# Patient Record
Sex: MALE | Race: WHITE | NOT HISPANIC OR LATINO
[De-identification: names, ages, dates, MRNs, and addresses within clinical notes are randomized per-mention and may not be internally consistent; named-entity substitution may affect disease eponyms.]

---

## 2017-01-25 ENCOUNTER — APPOINTMENT (OUTPATIENT)
Dept: OPHTHALMOLOGY | Facility: CLINIC | Age: 82
End: 2017-01-25

## 2017-03-06 ENCOUNTER — APPOINTMENT (OUTPATIENT)
Dept: OPHTHALMOLOGY | Facility: CLINIC | Age: 82
End: 2017-03-06

## 2017-04-17 ENCOUNTER — APPOINTMENT (OUTPATIENT)
Dept: OPHTHALMOLOGY | Facility: CLINIC | Age: 82
End: 2017-04-17

## 2017-06-02 ENCOUNTER — APPOINTMENT (OUTPATIENT)
Dept: OPHTHALMOLOGY | Facility: CLINIC | Age: 82
End: 2017-06-02

## 2017-07-21 ENCOUNTER — APPOINTMENT (OUTPATIENT)
Dept: OPHTHALMOLOGY | Facility: CLINIC | Age: 82
End: 2017-07-21

## 2017-09-14 ENCOUNTER — APPOINTMENT (OUTPATIENT)
Dept: OPHTHALMOLOGY | Facility: CLINIC | Age: 82
End: 2017-09-14
Payer: MEDICARE

## 2017-09-14 PROCEDURE — 92134 CPTRZ OPH DX IMG PST SGM RTA: CPT

## 2017-09-14 PROCEDURE — 67028 INJECTION EYE DRUG: CPT | Mod: RT

## 2017-11-02 ENCOUNTER — APPOINTMENT (OUTPATIENT)
Dept: OPHTHALMOLOGY | Facility: CLINIC | Age: 82
End: 2017-11-02
Payer: MEDICARE

## 2017-11-02 PROCEDURE — 92012 INTRM OPH EXAM EST PATIENT: CPT | Mod: 25

## 2017-11-02 PROCEDURE — 67028 INJECTION EYE DRUG: CPT | Mod: RT

## 2017-11-02 PROCEDURE — 92134 CPTRZ OPH DX IMG PST SGM RTA: CPT

## 2017-12-19 ENCOUNTER — APPOINTMENT (OUTPATIENT)
Dept: OPHTHALMOLOGY | Facility: CLINIC | Age: 82
End: 2017-12-19
Payer: MEDICARE

## 2017-12-19 PROCEDURE — 92226: CPT | Mod: LT

## 2017-12-19 PROCEDURE — 92134 CPTRZ OPH DX IMG PST SGM RTA: CPT

## 2017-12-19 PROCEDURE — 67028 INJECTION EYE DRUG: CPT | Mod: RT

## 2018-02-15 ENCOUNTER — APPOINTMENT (OUTPATIENT)
Dept: OPHTHALMOLOGY | Facility: CLINIC | Age: 83
End: 2018-02-15
Payer: MEDICARE

## 2018-02-15 DIAGNOSIS — H35.3230 EXUDATIVE AGE-RELATED MACULAR DEGENERATION, BILATERAL, STAGE UNSPECIFIED: ICD-10-CM

## 2018-02-15 PROCEDURE — 92226: CPT | Mod: LT

## 2018-02-15 PROCEDURE — 67028 INJECTION EYE DRUG: CPT | Mod: RT

## 2018-02-15 PROCEDURE — 92134 CPTRZ OPH DX IMG PST SGM RTA: CPT

## 2018-04-19 ENCOUNTER — APPOINTMENT (OUTPATIENT)
Dept: OPHTHALMOLOGY | Facility: CLINIC | Age: 83
End: 2018-04-19
Payer: MEDICARE

## 2018-04-19 DIAGNOSIS — Z00.00 ENCOUNTER FOR GENERAL ADULT MEDICAL EXAMINATION W/OUT ABNORMAL FINDINGS: ICD-10-CM

## 2018-04-19 PROCEDURE — 92134 CPTRZ OPH DX IMG PST SGM RTA: CPT

## 2018-04-19 PROCEDURE — 67028 INJECTION EYE DRUG: CPT | Mod: RT

## 2018-06-28 ENCOUNTER — APPOINTMENT (OUTPATIENT)
Dept: OPHTHALMOLOGY | Facility: CLINIC | Age: 83
End: 2018-06-28
Payer: MEDICARE

## 2018-06-28 DIAGNOSIS — H35.3221 EXUDATIVE AGE-RELATED MACULAR DEGENERATION, LEFT EYE, WITH ACTIVE CHOROIDAL NEOVASCULARIZATION: ICD-10-CM

## 2018-06-28 PROCEDURE — 92226: CPT | Mod: LT

## 2018-06-28 PROCEDURE — 92012 INTRM OPH EXAM EST PATIENT: CPT | Mod: 25

## 2018-06-28 PROCEDURE — 92134 CPTRZ OPH DX IMG PST SGM RTA: CPT

## 2018-06-28 PROCEDURE — 67028 INJECTION EYE DRUG: CPT | Mod: RT

## 2018-09-06 ENCOUNTER — APPOINTMENT (OUTPATIENT)
Dept: OPHTHALMOLOGY | Facility: CLINIC | Age: 83
End: 2018-09-06
Payer: MEDICARE

## 2018-09-06 PROCEDURE — 92134 CPTRZ OPH DX IMG PST SGM RTA: CPT

## 2018-09-06 PROCEDURE — 67028 INJECTION EYE DRUG: CPT | Mod: RT

## 2018-11-15 ENCOUNTER — APPOINTMENT (OUTPATIENT)
Dept: OPHTHALMOLOGY | Facility: CLINIC | Age: 83
End: 2018-11-15
Payer: MEDICARE

## 2018-11-15 PROCEDURE — 67028 INJECTION EYE DRUG: CPT | Mod: RT

## 2018-11-15 PROCEDURE — 92134 CPTRZ OPH DX IMG PST SGM RTA: CPT

## 2019-01-24 ENCOUNTER — APPOINTMENT (OUTPATIENT)
Dept: OPHTHALMOLOGY | Facility: CLINIC | Age: 84
End: 2019-01-24
Payer: MEDICARE

## 2019-01-24 PROCEDURE — 92134 CPTRZ OPH DX IMG PST SGM RTA: CPT

## 2019-01-24 PROCEDURE — 67028 INJECTION EYE DRUG: CPT | Mod: RT

## 2019-01-24 PROCEDURE — 92012 INTRM OPH EXAM EST PATIENT: CPT | Mod: 25

## 2019-04-10 ENCOUNTER — APPOINTMENT (OUTPATIENT)
Dept: OPHTHALMOLOGY | Facility: CLINIC | Age: 84
End: 2019-04-10
Payer: MEDICARE

## 2019-04-10 DIAGNOSIS — H33.313 HORSESHOE TEAR OF RETINA W/OUT DETACHMENT, BILATERAL: ICD-10-CM

## 2019-04-10 DIAGNOSIS — H01.006 UNSPECIFIED BLEPHARITIS RIGHT EYE, UNSPECIFIED EYELID: ICD-10-CM

## 2019-04-10 DIAGNOSIS — H01.003 UNSPECIFIED BLEPHARITIS RIGHT EYE, UNSPECIFIED EYELID: ICD-10-CM

## 2019-04-10 DIAGNOSIS — H43.813 VITREOUS DEGENERATION, BILATERAL: ICD-10-CM

## 2019-04-10 DIAGNOSIS — H35.3124 NONEXUDATIVE AGE-RELATED MACULAR DEGENERATION, LEFT EYE, ADVANCED ATROPHIC WITH SUBFOVEAL INVOLVEMENT: ICD-10-CM

## 2019-04-10 DIAGNOSIS — Z96.1 PRESENCE OF INTRAOCULAR LENS: ICD-10-CM

## 2019-04-10 PROCEDURE — 92134 CPTRZ OPH DX IMG PST SGM RTA: CPT

## 2019-04-10 PROCEDURE — 67028 INJECTION EYE DRUG: CPT | Mod: RT

## 2019-04-16 ENCOUNTER — APPOINTMENT (OUTPATIENT)
Dept: OPHTHALMOLOGY | Facility: CLINIC | Age: 84
End: 2019-04-16
Payer: MEDICARE

## 2019-04-16 DIAGNOSIS — H35.3211 EXUDATIVE AGE-RELATED MACULAR DEGENERATION, RIGHT EYE, WITH ACTIVE CHOROIDAL NEOVASCULARIZATION: ICD-10-CM

## 2019-04-16 PROCEDURE — 92134 CPTRZ OPH DX IMG PST SGM RTA: CPT

## 2019-04-16 PROCEDURE — 92012 INTRM OPH EXAM EST PATIENT: CPT

## 2019-04-18 PROBLEM — H35.3211 EXUDATIVE AGE-RELATED MACULAR DEGENERATION OF RIGHT EYE WITH ACTIVE CHOROIDAL NEOVASCULARIZATION: Status: ACTIVE | Noted: 2018-02-15

## 2019-06-12 ENCOUNTER — APPOINTMENT (OUTPATIENT)
Dept: OPHTHALMOLOGY | Facility: CLINIC | Age: 84
End: 2019-06-12
Payer: MEDICARE

## 2019-06-12 ENCOUNTER — NON-APPOINTMENT (OUTPATIENT)
Age: 84
End: 2019-06-12

## 2019-06-12 PROCEDURE — 92134 CPTRZ OPH DX IMG PST SGM RTA: CPT

## 2019-06-12 PROCEDURE — 67028 INJECTION EYE DRUG: CPT | Mod: RT

## 2019-08-21 ENCOUNTER — NON-APPOINTMENT (OUTPATIENT)
Age: 84
End: 2019-08-21

## 2019-08-21 ENCOUNTER — APPOINTMENT (OUTPATIENT)
Dept: OPHTHALMOLOGY | Facility: CLINIC | Age: 84
End: 2019-08-21
Payer: MEDICARE

## 2019-08-21 PROCEDURE — 92012 INTRM OPH EXAM EST PATIENT: CPT | Mod: 25

## 2019-08-21 PROCEDURE — 67028 INJECTION EYE DRUG: CPT | Mod: RT

## 2019-08-21 PROCEDURE — 92134 CPTRZ OPH DX IMG PST SGM RTA: CPT

## 2019-08-21 PROCEDURE — 92226: CPT | Mod: LT

## 2019-11-06 ENCOUNTER — NON-APPOINTMENT (OUTPATIENT)
Age: 84
End: 2019-11-06

## 2019-11-06 ENCOUNTER — APPOINTMENT (OUTPATIENT)
Dept: OPHTHALMOLOGY | Facility: CLINIC | Age: 84
End: 2019-11-06
Payer: MEDICARE

## 2019-11-06 PROCEDURE — 92134 CPTRZ OPH DX IMG PST SGM RTA: CPT

## 2019-11-06 PROCEDURE — 67028 INJECTION EYE DRUG: CPT | Mod: RT

## 2020-01-29 ENCOUNTER — APPOINTMENT (OUTPATIENT)
Dept: OPHTHALMOLOGY | Facility: CLINIC | Age: 85
End: 2020-01-29
Payer: MEDICARE

## 2020-01-29 ENCOUNTER — NON-APPOINTMENT (OUTPATIENT)
Age: 85
End: 2020-01-29

## 2020-01-29 PROCEDURE — 92012 INTRM OPH EXAM EST PATIENT: CPT | Mod: 25

## 2020-01-29 PROCEDURE — 92134 CPTRZ OPH DX IMG PST SGM RTA: CPT

## 2020-01-29 PROCEDURE — 67028 INJECTION EYE DRUG: CPT | Mod: RT

## 2020-04-27 ENCOUNTER — APPOINTMENT (OUTPATIENT)
Dept: OPHTHALMOLOGY | Facility: CLINIC | Age: 85
End: 2020-04-27

## 2020-04-29 ENCOUNTER — APPOINTMENT (OUTPATIENT)
Dept: OPHTHALMOLOGY | Facility: CLINIC | Age: 85
End: 2020-04-29
Payer: MEDICARE

## 2020-04-29 ENCOUNTER — NON-APPOINTMENT (OUTPATIENT)
Age: 85
End: 2020-04-29

## 2020-04-29 PROCEDURE — 92134 CPTRZ OPH DX IMG PST SGM RTA: CPT

## 2020-04-29 PROCEDURE — 67028 INJECTION EYE DRUG: CPT | Mod: RT

## 2020-08-05 ENCOUNTER — NON-APPOINTMENT (OUTPATIENT)
Age: 85
End: 2020-08-05

## 2020-08-05 ENCOUNTER — APPOINTMENT (OUTPATIENT)
Dept: OPHTHALMOLOGY | Facility: CLINIC | Age: 85
End: 2020-08-05
Payer: MEDICARE

## 2020-08-05 PROCEDURE — 92012 INTRM OPH EXAM EST PATIENT: CPT | Mod: 25

## 2020-08-05 PROCEDURE — 92134 CPTRZ OPH DX IMG PST SGM RTA: CPT

## 2020-08-05 PROCEDURE — 67028 INJECTION EYE DRUG: CPT | Mod: RT

## 2020-11-11 ENCOUNTER — APPOINTMENT (OUTPATIENT)
Dept: OPHTHALMOLOGY | Facility: CLINIC | Age: 85
End: 2020-11-11
Payer: MEDICARE

## 2020-11-11 ENCOUNTER — NON-APPOINTMENT (OUTPATIENT)
Age: 85
End: 2020-11-11

## 2020-11-11 PROCEDURE — 92134 CPTRZ OPH DX IMG PST SGM RTA: CPT

## 2020-11-11 PROCEDURE — 67028 INJECTION EYE DRUG: CPT | Mod: RT

## 2021-02-24 ENCOUNTER — NON-APPOINTMENT (OUTPATIENT)
Age: 86
End: 2021-02-24

## 2021-02-24 ENCOUNTER — APPOINTMENT (OUTPATIENT)
Dept: OPHTHALMOLOGY | Facility: CLINIC | Age: 86
End: 2021-02-24
Payer: MEDICARE

## 2021-02-24 PROCEDURE — 67028 INJECTION EYE DRUG: CPT | Mod: RT

## 2021-02-24 PROCEDURE — 92134 CPTRZ OPH DX IMG PST SGM RTA: CPT

## 2021-06-09 ENCOUNTER — NON-APPOINTMENT (OUTPATIENT)
Age: 86
End: 2021-06-09

## 2021-06-09 ENCOUNTER — APPOINTMENT (OUTPATIENT)
Dept: OPHTHALMOLOGY | Facility: CLINIC | Age: 86
End: 2021-06-09
Payer: MEDICARE

## 2021-06-09 PROCEDURE — 92134 CPTRZ OPH DX IMG PST SGM RTA: CPT

## 2021-06-09 PROCEDURE — 92012 INTRM OPH EXAM EST PATIENT: CPT | Mod: 25

## 2021-06-09 PROCEDURE — 67028 INJECTION EYE DRUG: CPT | Mod: RT

## 2021-09-22 ENCOUNTER — NON-APPOINTMENT (OUTPATIENT)
Age: 86
End: 2021-09-22

## 2021-09-22 ENCOUNTER — APPOINTMENT (OUTPATIENT)
Dept: OPHTHALMOLOGY | Facility: CLINIC | Age: 86
End: 2021-09-22
Payer: MEDICARE

## 2021-09-22 PROCEDURE — 92134 CPTRZ OPH DX IMG PST SGM RTA: CPT

## 2021-09-22 PROCEDURE — 67028 INJECTION EYE DRUG: CPT | Mod: RT

## 2022-01-12 ENCOUNTER — APPOINTMENT (OUTPATIENT)
Dept: OPHTHALMOLOGY | Facility: CLINIC | Age: 87
End: 2022-01-12
Payer: MEDICARE

## 2022-01-12 ENCOUNTER — NON-APPOINTMENT (OUTPATIENT)
Age: 87
End: 2022-01-12

## 2022-01-12 PROCEDURE — 67028 INJECTION EYE DRUG: CPT | Mod: RT

## 2022-01-12 PROCEDURE — 92134 CPTRZ OPH DX IMG PST SGM RTA: CPT

## 2022-01-12 PROCEDURE — 92012 INTRM OPH EXAM EST PATIENT: CPT | Mod: 25

## 2022-05-04 ENCOUNTER — APPOINTMENT (OUTPATIENT)
Dept: OPHTHALMOLOGY | Facility: CLINIC | Age: 87
End: 2022-05-04
Payer: MEDICARE

## 2022-05-04 ENCOUNTER — NON-APPOINTMENT (OUTPATIENT)
Age: 87
End: 2022-05-04

## 2022-05-04 PROCEDURE — 67028 INJECTION EYE DRUG: CPT | Mod: RT

## 2022-05-04 PROCEDURE — 92134 CPTRZ OPH DX IMG PST SGM RTA: CPT

## 2022-08-24 ENCOUNTER — APPOINTMENT (OUTPATIENT)
Dept: OPHTHALMOLOGY | Facility: CLINIC | Age: 87
End: 2022-08-24

## 2022-08-24 ENCOUNTER — NON-APPOINTMENT (OUTPATIENT)
Age: 87
End: 2022-08-24

## 2022-08-24 PROCEDURE — 92012 INTRM OPH EXAM EST PATIENT: CPT | Mod: 25

## 2022-08-24 PROCEDURE — 67028 INJECTION EYE DRUG: CPT | Mod: RT

## 2022-08-24 PROCEDURE — 92134 CPTRZ OPH DX IMG PST SGM RTA: CPT

## 2022-09-12 ENCOUNTER — APPOINTMENT (OUTPATIENT)
Dept: OPHTHALMOLOGY | Facility: CLINIC | Age: 87
End: 2022-09-12

## 2022-09-12 ENCOUNTER — NON-APPOINTMENT (OUTPATIENT)
Age: 87
End: 2022-09-12

## 2022-09-12 PROCEDURE — 92201 OPSCPY EXTND RTA DRAW UNI/BI: CPT

## 2022-09-12 PROCEDURE — 92012 INTRM OPH EXAM EST PATIENT: CPT

## 2022-10-27 ENCOUNTER — APPOINTMENT (OUTPATIENT)
Dept: OPHTHALMOLOGY | Facility: CLINIC | Age: 87
End: 2022-10-27

## 2022-11-14 ENCOUNTER — APPOINTMENT (OUTPATIENT)
Dept: OPHTHALMOLOGY | Facility: CLINIC | Age: 87
End: 2022-11-14

## 2022-11-14 ENCOUNTER — NON-APPOINTMENT (OUTPATIENT)
Age: 87
End: 2022-11-14

## 2022-11-14 PROCEDURE — 92134 CPTRZ OPH DX IMG PST SGM RTA: CPT

## 2022-11-14 PROCEDURE — 92012 INTRM OPH EXAM EST PATIENT: CPT

## 2022-12-14 ENCOUNTER — APPOINTMENT (OUTPATIENT)
Dept: OPHTHALMOLOGY | Facility: CLINIC | Age: 87
End: 2022-12-14

## 2022-12-14 ENCOUNTER — NON-APPOINTMENT (OUTPATIENT)
Age: 87
End: 2022-12-14

## 2022-12-14 PROCEDURE — 92134 CPTRZ OPH DX IMG PST SGM RTA: CPT

## 2022-12-14 PROCEDURE — 67028 INJECTION EYE DRUG: CPT | Mod: RT

## 2023-04-03 ENCOUNTER — APPOINTMENT (OUTPATIENT)
Dept: OPHTHALMOLOGY | Facility: CLINIC | Age: 88
End: 2023-04-03
Payer: MEDICARE

## 2023-04-03 ENCOUNTER — NON-APPOINTMENT (OUTPATIENT)
Age: 88
End: 2023-04-03

## 2023-04-03 PROCEDURE — 92134 CPTRZ OPH DX IMG PST SGM RTA: CPT

## 2023-04-03 PROCEDURE — 67028 INJECTION EYE DRUG: CPT | Mod: RT

## 2023-07-20 ENCOUNTER — APPOINTMENT (OUTPATIENT)
Dept: OPHTHALMOLOGY | Facility: CLINIC | Age: 88
End: 2023-07-20
Payer: MEDICARE

## 2023-07-20 ENCOUNTER — NON-APPOINTMENT (OUTPATIENT)
Age: 88
End: 2023-07-20

## 2023-07-20 PROCEDURE — 92012 INTRM OPH EXAM EST PATIENT: CPT | Mod: 25

## 2023-07-20 PROCEDURE — 92134 CPTRZ OPH DX IMG PST SGM RTA: CPT

## 2023-07-20 PROCEDURE — 67028 INJECTION EYE DRUG: CPT | Mod: 50

## 2023-08-30 ENCOUNTER — APPOINTMENT (OUTPATIENT)
Dept: OPHTHALMOLOGY | Facility: CLINIC | Age: 88
End: 2023-08-30
Payer: MEDICARE

## 2023-08-30 ENCOUNTER — NON-APPOINTMENT (OUTPATIENT)
Age: 88
End: 2023-08-30

## 2023-08-30 PROCEDURE — 67028 INJECTION EYE DRUG: CPT | Mod: LT

## 2023-08-30 PROCEDURE — 92134 CPTRZ OPH DX IMG PST SGM RTA: CPT

## 2023-10-13 ENCOUNTER — APPOINTMENT (OUTPATIENT)
Dept: OPHTHALMOLOGY | Facility: CLINIC | Age: 88
End: 2023-10-13
Payer: MEDICARE

## 2023-10-13 ENCOUNTER — NON-APPOINTMENT (OUTPATIENT)
Age: 88
End: 2023-10-13

## 2023-10-13 PROCEDURE — 92134 CPTRZ OPH DX IMG PST SGM RTA: CPT

## 2023-10-13 PROCEDURE — 67028 INJECTION EYE DRUG: CPT | Mod: LT

## 2023-11-10 ENCOUNTER — NON-APPOINTMENT (OUTPATIENT)
Age: 88
End: 2023-11-10

## 2023-11-10 ENCOUNTER — APPOINTMENT (OUTPATIENT)
Dept: OPHTHALMOLOGY | Facility: CLINIC | Age: 88
End: 2023-11-10
Payer: MEDICARE

## 2023-11-10 PROCEDURE — 67028 INJECTION EYE DRUG: CPT | Mod: RT

## 2023-11-10 PROCEDURE — 92134 CPTRZ OPH DX IMG PST SGM RTA: CPT

## 2023-12-04 ENCOUNTER — NON-APPOINTMENT (OUTPATIENT)
Age: 88
End: 2023-12-04

## 2023-12-04 ENCOUNTER — APPOINTMENT (OUTPATIENT)
Dept: OPHTHALMOLOGY | Facility: CLINIC | Age: 88
End: 2023-12-04
Payer: MEDICARE

## 2023-12-04 PROCEDURE — 92134 CPTRZ OPH DX IMG PST SGM RTA: CPT

## 2023-12-04 PROCEDURE — 67028 INJECTION EYE DRUG: CPT | Mod: LT

## 2024-01-29 ENCOUNTER — APPOINTMENT (OUTPATIENT)
Dept: VASCULAR SURGERY | Facility: CLINIC | Age: 89
End: 2024-01-29
Payer: MEDICARE

## 2024-01-29 ENCOUNTER — OUTPATIENT (OUTPATIENT)
Dept: OUTPATIENT SERVICES | Facility: HOSPITAL | Age: 89
LOS: 1 days | End: 2024-01-29
Payer: MEDICARE

## 2024-01-29 ENCOUNTER — APPOINTMENT (OUTPATIENT)
Dept: WOUND CARE | Facility: HOSPITAL | Age: 89
End: 2024-01-29
Payer: MEDICARE

## 2024-01-29 VITALS
DIASTOLIC BLOOD PRESSURE: 70 MMHG | OXYGEN SATURATION: 96 % | TEMPERATURE: 97.8 F | RESPIRATION RATE: 18 BRPM | HEART RATE: 65 BPM | SYSTOLIC BLOOD PRESSURE: 158 MMHG

## 2024-01-29 VITALS
HEIGHT: 68 IN | BODY MASS INDEX: 24.25 KG/M2 | HEART RATE: 69 BPM | SYSTOLIC BLOOD PRESSURE: 156 MMHG | DIASTOLIC BLOOD PRESSURE: 71 MMHG | TEMPERATURE: 97.4 F | WEIGHT: 160 LBS

## 2024-01-29 DIAGNOSIS — Z95.0 PRESENCE OF CARDIAC PACEMAKER: ICD-10-CM

## 2024-01-29 DIAGNOSIS — Z78.9 OTHER SPECIFIED HEALTH STATUS: ICD-10-CM

## 2024-01-29 DIAGNOSIS — I73.9 PERIPHERAL VASCULAR DISEASE, UNSPECIFIED: ICD-10-CM

## 2024-01-29 DIAGNOSIS — Z95.2 PRESENCE OF PROSTHETIC HEART VALVE: ICD-10-CM

## 2024-01-29 PROCEDURE — 99203 OFFICE O/P NEW LOW 30 MIN: CPT

## 2024-01-29 PROCEDURE — 93923 UPR/LXTR ART STDY 3+ LVLS: CPT

## 2024-01-29 PROCEDURE — G0463: CPT

## 2024-01-29 PROCEDURE — 99213 OFFICE O/P EST LOW 20 MIN: CPT

## 2024-01-29 RX ORDER — ATENOLOL 25 MG/1
25 TABLET ORAL
Refills: 0 | Status: ACTIVE | COMMUNITY

## 2024-01-29 RX ORDER — LISINOPRIL 30 MG/1
TABLET ORAL
Refills: 0 | Status: ACTIVE | COMMUNITY

## 2024-01-29 RX ORDER — ATORVASTATIN CALCIUM 20 MG/1
20 TABLET, FILM COATED ORAL
Refills: 0 | Status: ACTIVE | COMMUNITY

## 2024-01-29 RX ORDER — FINASTERIDE 5 MG/1
5 TABLET, FILM COATED ORAL
Refills: 0 | Status: ACTIVE | COMMUNITY

## 2024-01-29 RX ORDER — LEVOTHYROXINE SODIUM 137 UG/1
TABLET ORAL
Refills: 0 | Status: ACTIVE | COMMUNITY

## 2024-01-29 RX ORDER — ESCITALOPRAM OXALATE 5 MG/1
5 TABLET, FILM COATED ORAL
Refills: 0 | Status: ACTIVE | COMMUNITY

## 2024-01-29 RX ORDER — CLOPIDOGREL BISULFATE 75 MG/1
75 TABLET, FILM COATED ORAL
Refills: 0 | Status: ACTIVE | COMMUNITY

## 2024-01-29 RX ORDER — ASPIRIN 81 MG
81 TABLET, DELAYED RELEASE (ENTERIC COATED) ORAL
Refills: 0 | Status: ACTIVE | COMMUNITY

## 2024-01-29 NOTE — ASSESSMENT
[FreeTextEntry1] : In summary, Mr. 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 AMERICO/PVR/toe pressures were obtained in the office and showed non-compressible vessels and blunted infragenicular waveforms. Toe pressures were 31 (right and 83mmHg (left). We will repeat a BMP to assess his renal function and schedule him for right lower extremity angiography. He should continue foot care per Dr. Jerry.   Thank you for allowing me to participate in the care of this nice man. Please do not hesitate to contact me with any questions.

## 2024-01-29 NOTE — HISTORY OF PRESENT ILLNESS
[FreeTextEntry1] : I have just had the pleasure of seeing Mr. Lex Solorio in consultation from Dr. Jerry for right lower extremity arterial insufficiency.   Mr. Solorio is a 89-year-old gentleman who presents with a three month history of non-healing right first toe wound. He was previously placed on antibiotics. He is followed by Dr. Jerry from podiatry. He has not had any non-invasive testing. He denies any symptoms of lower extremity claudication. He reports rest pain in the distal right foot. He has not had any prior vascular surgical intervention on his lower extremities. He underwent PCI/TAVR at [facility] in July 2023. He denies any history of CVA or TIA. He denies any history of CRI or DM although per bloodwork from last week Creatinine is 1.47.  His medical history is otherwise significant for remote CABG x 4 with left GSV harvest, HTN, HLD, hypothyroidism, and bilateral macular degeneration and retinal tears.  Medications: Atenolol, Lipitor, Plavix, Aspirin, Lexapro, Lisinopril, Proscar and Synthroid.  Allergies: NDKA  Social history: Non-smoker  FH: NC

## 2024-01-29 NOTE — PHYSICAL EXAM
[de-identified] : On physical examination the patient is in no acute distress and neurologically intact. The lungs are clear to auscultation and the heart has a regular rate and rhythm. Abdomen is benign. Bilateral femoral pulses are palpable. Pedal pulses are non-palpable. Rigth distal first toe 1cm ulcer.

## 2024-01-30 LAB
ANION GAP SERPL CALC-SCNC: 10 MMOL/L
BUN SERPL-MCNC: 33 MG/DL
CALCIUM SERPL-MCNC: 8.4 MG/DL
CHLORIDE SERPL-SCNC: 102 MMOL/L
CO2 SERPL-SCNC: 27 MMOL/L
CREAT SERPL-MCNC: 1.57 MG/DL
EGFR: 41 ML/MIN/1.73M2
GLUCOSE SERPL-MCNC: 107 MG/DL
POTASSIUM SERPL-SCNC: 4.7 MMOL/L
SODIUM SERPL-SCNC: 139 MMOL/L

## 2024-02-05 ENCOUNTER — NON-APPOINTMENT (OUTPATIENT)
Age: 89
End: 2024-02-05

## 2024-02-05 ENCOUNTER — APPOINTMENT (OUTPATIENT)
Dept: OPHTHALMOLOGY | Facility: CLINIC | Age: 89
End: 2024-02-05
Payer: MEDICARE

## 2024-02-05 PROCEDURE — 67028 INJECTION EYE DRUG: CPT | Mod: LT,PD

## 2024-02-05 PROCEDURE — 92012 INTRM OPH EXAM EST PATIENT: CPT | Mod: 25,PD

## 2024-02-05 PROCEDURE — 92134 CPTRZ OPH DX IMG PST SGM RTA: CPT | Mod: PD

## 2024-02-07 ENCOUNTER — INPATIENT (INPATIENT)
Facility: HOSPITAL | Age: 89
LOS: 0 days | Discharge: HOME | End: 2024-02-08
Attending: SURGERY | Admitting: SURGERY
Payer: MEDICARE

## 2024-02-07 VITALS
DIASTOLIC BLOOD PRESSURE: 69 MMHG | SYSTOLIC BLOOD PRESSURE: 161 MMHG | RESPIRATION RATE: 17 BRPM | HEART RATE: 66 BPM | OXYGEN SATURATION: 97 %

## 2024-02-07 DIAGNOSIS — Z95.1 PRESENCE OF AORTOCORONARY BYPASS GRAFT: Chronic | ICD-10-CM

## 2024-02-07 DIAGNOSIS — I10 ESSENTIAL (PRIMARY) HYPERTENSION: ICD-10-CM

## 2024-02-07 DIAGNOSIS — Z98.62 PERIPHERAL VASCULAR ANGIOPLASTY STATUS: ICD-10-CM

## 2024-02-07 DIAGNOSIS — I25.10 ATHEROSCLEROTIC HEART DISEASE OF NATIVE CORONARY ARTERY WITHOUT ANGINA PECTORIS: ICD-10-CM

## 2024-02-07 DIAGNOSIS — E78.5 HYPERLIPIDEMIA, UNSPECIFIED: ICD-10-CM

## 2024-02-07 DIAGNOSIS — N40.0 BENIGN PROSTATIC HYPERPLASIA WITHOUT LOWER URINARY TRACT SYMPTOMS: ICD-10-CM

## 2024-02-07 DIAGNOSIS — Z95.2 PRESENCE OF PROSTHETIC HEART VALVE: Chronic | ICD-10-CM

## 2024-02-07 DIAGNOSIS — I73.9 PERIPHERAL VASCULAR DISEASE, UNSPECIFIED: ICD-10-CM

## 2024-02-07 LAB
ANION GAP SERPL CALC-SCNC: 10 MMOL/L — SIGNIFICANT CHANGE UP (ref 7–14)
APTT BLD: 28.4 SEC — SIGNIFICANT CHANGE UP (ref 24.5–35.6)
BLD GP AB SCN SERPL QL: NEGATIVE — SIGNIFICANT CHANGE UP
BUN SERPL-MCNC: 35 MG/DL — HIGH (ref 7–23)
CALCIUM SERPL-MCNC: 8.4 MG/DL — SIGNIFICANT CHANGE UP (ref 8.4–10.5)
CHLORIDE SERPL-SCNC: 100 MMOL/L — SIGNIFICANT CHANGE UP (ref 98–107)
CO2 SERPL-SCNC: 27 MMOL/L — SIGNIFICANT CHANGE UP (ref 22–31)
CREAT SERPL-MCNC: 1.52 MG/DL — HIGH (ref 0.5–1.3)
EGFR: 43 ML/MIN/1.73M2 — LOW
GLUCOSE SERPL-MCNC: 118 MG/DL — HIGH (ref 70–99)
HCT VFR BLD CALC: 36.6 % — LOW (ref 39–50)
HGB BLD-MCNC: 12 G/DL — LOW (ref 13–17)
INR BLD: 1.01 RATIO — SIGNIFICANT CHANGE UP (ref 0.85–1.18)
MAGNESIUM SERPL-MCNC: 2.4 MG/DL — SIGNIFICANT CHANGE UP (ref 1.6–2.6)
MCHC RBC-ENTMCNC: 30.5 PG — SIGNIFICANT CHANGE UP (ref 27–34)
MCHC RBC-ENTMCNC: 32.8 GM/DL — SIGNIFICANT CHANGE UP (ref 32–36)
MCV RBC AUTO: 92.9 FL — SIGNIFICANT CHANGE UP (ref 80–100)
NRBC # BLD: 0 /100 WBCS — SIGNIFICANT CHANGE UP (ref 0–0)
NRBC # FLD: 0 K/UL — SIGNIFICANT CHANGE UP (ref 0–0)
PHOSPHATE SERPL-MCNC: 3.3 MG/DL — SIGNIFICANT CHANGE UP (ref 2.5–4.5)
PLATELET # BLD AUTO: 170 K/UL — SIGNIFICANT CHANGE UP (ref 150–400)
POTASSIUM SERPL-MCNC: 4.4 MMOL/L — SIGNIFICANT CHANGE UP (ref 3.5–5.3)
POTASSIUM SERPL-SCNC: 4.4 MMOL/L — SIGNIFICANT CHANGE UP (ref 3.5–5.3)
PROTHROM AB SERPL-ACNC: 11.4 SEC — SIGNIFICANT CHANGE UP (ref 9.5–13)
RBC # BLD: 3.94 M/UL — LOW (ref 4.2–5.8)
RBC # FLD: 13.8 % — SIGNIFICANT CHANGE UP (ref 10.3–14.5)
RH IG SCN BLD-IMP: POSITIVE — SIGNIFICANT CHANGE UP
SODIUM SERPL-SCNC: 137 MMOL/L — SIGNIFICANT CHANGE UP (ref 135–145)
WBC # BLD: 8.32 K/UL — SIGNIFICANT CHANGE UP (ref 3.8–10.5)
WBC # FLD AUTO: 8.32 K/UL — SIGNIFICANT CHANGE UP (ref 3.8–10.5)

## 2024-02-07 PROCEDURE — 99222 1ST HOSP IP/OBS MODERATE 55: CPT

## 2024-02-07 PROCEDURE — 93306 TTE W/DOPPLER COMPLETE: CPT | Mod: 26

## 2024-02-07 PROCEDURE — 93010 ELECTROCARDIOGRAM REPORT: CPT

## 2024-02-07 PROCEDURE — 99223 1ST HOSP IP/OBS HIGH 75: CPT

## 2024-02-07 PROCEDURE — 93280 PM DEVICE PROGR EVAL DUAL: CPT | Mod: 26

## 2024-02-07 RX ORDER — LEVOTHYROXINE SODIUM 125 MCG
50 TABLET ORAL DAILY
Refills: 0 | Status: DISCONTINUED | OUTPATIENT
Start: 2024-02-08 | End: 2024-02-08

## 2024-02-07 RX ORDER — ATENOLOL 25 MG/1
25 TABLET ORAL DAILY
Refills: 0 | Status: DISCONTINUED | OUTPATIENT
Start: 2024-02-08 | End: 2024-02-08

## 2024-02-07 RX ORDER — ESCITALOPRAM OXALATE 10 MG/1
5 TABLET, FILM COATED ORAL DAILY
Refills: 0 | Status: DISCONTINUED | OUTPATIENT
Start: 2024-02-08 | End: 2024-02-08

## 2024-02-07 RX ORDER — ASPIRIN/CALCIUM CARB/MAGNESIUM 324 MG
81 TABLET ORAL DAILY
Refills: 0 | Status: DISCONTINUED | OUTPATIENT
Start: 2024-02-08 | End: 2024-02-08

## 2024-02-07 RX ORDER — FINASTERIDE 5 MG/1
5 TABLET, FILM COATED ORAL DAILY
Refills: 0 | Status: DISCONTINUED | OUTPATIENT
Start: 2024-02-07 | End: 2024-02-08

## 2024-02-07 RX ORDER — CLOPIDOGREL BISULFATE 75 MG/1
75 TABLET, FILM COATED ORAL DAILY
Refills: 0 | Status: DISCONTINUED | OUTPATIENT
Start: 2024-02-08 | End: 2024-02-08

## 2024-02-07 RX ORDER — ATORVASTATIN CALCIUM 80 MG/1
20 TABLET, FILM COATED ORAL AT BEDTIME
Refills: 0 | Status: DISCONTINUED | OUTPATIENT
Start: 2024-02-08 | End: 2024-02-08

## 2024-02-07 RX ORDER — HEPARIN SODIUM 5000 [USP'U]/ML
5000 INJECTION INTRAVENOUS; SUBCUTANEOUS EVERY 8 HOURS
Refills: 0 | Status: DISCONTINUED | OUTPATIENT
Start: 2024-02-07 | End: 2024-02-08

## 2024-02-07 RX ORDER — SODIUM CHLORIDE 9 MG/ML
1000 INJECTION INTRAMUSCULAR; INTRAVENOUS; SUBCUTANEOUS
Refills: 0 | Status: DISCONTINUED | OUTPATIENT
Start: 2024-02-07 | End: 2024-02-08

## 2024-02-07 RX ORDER — ACETAMINOPHEN 500 MG
650 TABLET ORAL EVERY 6 HOURS
Refills: 0 | Status: DISCONTINUED | OUTPATIENT
Start: 2024-02-07 | End: 2024-02-08

## 2024-02-07 RX ORDER — LISINOPRIL 2.5 MG/1
20 TABLET ORAL DAILY
Refills: 0 | Status: DISCONTINUED | OUTPATIENT
Start: 2024-02-08 | End: 2024-02-08

## 2024-02-07 RX ADMIN — HEPARIN SODIUM 5000 UNIT(S): 5000 INJECTION INTRAVENOUS; SUBCUTANEOUS at 23:11

## 2024-02-07 RX ADMIN — HEPARIN SODIUM 5000 UNIT(S): 5000 INJECTION INTRAVENOUS; SUBCUTANEOUS at 15:37

## 2024-02-07 NOTE — H&P ADULT - NSHPPHYSICALEXAM_GEN_ALL_CORE
GENERAL: NAD, sitting in bed comfortably  HEAD:  Atraumatic, normocephalic  EYES: EOMI, PERRLA, conjunctiva and sclera clear  NECK: Supple, trachea midline, no JVD  HEART: Regular rate and rhythm, no murmurs, rubs, or gallops  LUNGS: Unlabored respirations.  Clear to auscultation bilaterally, no crackles, wheezing, or rhonchi  ABDOMEN: Soft, nontender, nondistended, +BS  EXTREMITIES: FROM, wwp. Motor and sensation intact in b/l LE.  VASCULAR:    RLE - Dopplerable PT/DP signals  NEUROLOGICAL: AOx4   SKIN: non-purulent ulcer on right 1st toe

## 2024-02-07 NOTE — PROCEDURE NOTE - ADDITIONAL PROCEDURE DETAILS
interrogation for foot angiogram  Dual Chamber pacemaker in DDD mode   Normal sensing and pacing via iterative testing  Excellent threshold capture  No events recorded   No reprogramming  Dural chamber PPM was implanted on 7/31/2023   Battery remaining capacity to TAMMY : > 95%  patient is not pacer dependent

## 2024-02-07 NOTE — CONSULT NOTE ADULT - SUBJECTIVE AND OBJECTIVE BOX
CHIEF COMPLAINT: Patient is a 89y old  Male who presents with a chief complaint of Non -healing right toe wound (07 Feb 2024 12:44)      HPI: HPI:  Patient is a 89 y/o male with a PMH of hypothyroidism (on synthroid), BPH (on finasteride), HTN, Depression (on Lexapro), Aortic stenosis s/p TAVR and pacemaker placement (7/2023), CAD s/p CABG (1988), and PAD. Patient was seen in Dr. Arevalo's office last week with a non-healing right first toe wound and associated tingling, which has progressively worsened since Nov. Denies any rest pain or claudication with ambulating. Endorses intermittent foot pain 2/2 to plantar fascitis. AMERICO/PVR obtained in the office were reported by the patient to be abnormal. Presents to the hospital for a scheduled(2/8/2023) angiogram and possible angioplasty/stent placement with Dr. Arevalo. At this time patient denies any CP, SOB, or N/V.    Patient follows with Dr. Harmon ([place], [place]) for his previous CABG and Dr. Ortiz ([place]) for their previous TAVR and subsequent pacemaker placement.  (07 Feb 2024 11:58)    - states that has been having pain in his right toe, not really worsened with exertion   - able to walk >8 blocks without stopping since his TAVR this past summer, although walking has been limited recently due to plantar facititis  - developed toe wound on the right foot, but has not been healing   - went to [facility] today, AMERICO/PVRs were abnormal, now admitted for angiogram and further workup    Pain Symptoms if applicable:             	                           none	    mild         moderate         severe  	                            0	     1-3	      4-6	          7-10  Pain:  Location:	  Modifying factors:	  Associated symptoms:	    Allergies    No Known Allergies    Intolerances        HOME MEDICATIONS:   Home Medications:  aspirin 81 mg oral tablet: orally once a day (07 Feb 2024 12:07)  atenolol 25 mg oral tablet: 1 tab(s) orally once a day (07 Feb 2024 12:08)  atorvastatin 20 mg oral tablet: 1 tab(s) orally once a day (07 Feb 2024 12:04)  clopidogrel 75 mg oral tablet: 1 tab(s) orally once a day (07 Feb 2024 12:03)  levothyroxine 50 mcg (0.05 mg) oral tablet: 1 tab(s) orally once a day (07 Feb 2024 12:04)  Lexapro 5 mg oral tablet: 1 tab(s) orally once a day (07 Feb 2024 12:11)  lisinopril-hydrochlorothiazide 20 mg-12.5 mg oral tablet: 1 tab(s) orally once a day (07 Feb 2024 12:09)  Proscar 5 mg oral tablet: 1 tab(s) orally once a day (07 Feb 2024 12:07)    [x] Reviewed    MEDICATIONS  (STANDING):  finasteride 5 milliGRAM(s) Oral daily  heparin   Injectable 5000 Unit(s) SubCutaneous every 8 hours  sodium chloride 0.9%. 1000 milliLiter(s) (100 mL/Hr) IV Continuous <Continuous>    MEDICATIONS  (PRN):  acetaminophen     Tablet .. 650 milliGRAM(s) Oral every 6 hours PRN Mild Pain (1 - 3), Moderate Pain (4 - 6)      PAST MEDICAL & SURGICAL HISTORY:  [ ] Reviewed     SOCIAL HISTORY:  [x] Substance abuse: never   [x] Tobacco: never   [x] Alcohol use: ovassional    FAMILY HISTORY:  Mother and Father with CAD, both at advanced age.     REVIEW OF SYSTEMS:    [x] All other ROS negative  [  ] Unable to obtain due to poor mental status      PHYSICAL EXAM:  Vital Signs Last 24 Hrs  T(C): --  T(F): --  HR: 66 (07 Feb 2024 11:21) (66 - 66)  BP: 161/69 (07 Feb 2024 11:21) (161/69 - 161/69)  BP(mean): --  RR: 17 (07 Feb 2024 11:21) (17 - 17)  SpO2: 97% (07 Feb 2024 11:21) (97% - 97%)    Parameters below as of 07 Feb 2024 11:21  Patient On (Oxygen Delivery Method): room air      General: elderly man sitting up in bed appears comfortable in NAD, awake and alert  Eyes: PERRLA, nonicteric sclera  HENMT: NCAT, MMM, nares patent, no tonsillar exudates  Neck: Supple, no thyromegaly, trachea midline   Respiratory: No respiratory distress, CTABL, No rales, rhonchi, wheezing.  Cardiovascular: Regular rate and rhythm; No m/g/r. no palpable pulse in the right DP/PT  Gastrointestinal: Soft, Nontender, Nondistended; +BS. No palpable organomegaly  Extremities: right hallux toe wound, bandaged; No c/c/e; warm to touch  Neurological: Speech fluent/face symmetric. Moving all 4 extremities; Sensation to LT grossly in tact in BLEs  Musculoskeletal: Normal ROM, no joint swelling.  Skin: No rashes, No erythema   Psych: AAOx3; appropriate mood and affect    LABS:                        12.0   8.32  )-----------( 170      ( 07 Feb 2024 12:19 )             36.6     02-07    137  |  100  |  35<H>  ----------------------------<  118<H>  4.4   |  27  |  1.52<H>    Ca    8.4      07 Feb 2024 12:19  Phos  3.3     02-07  Mg     2.40     02-07      PT/INR - ( 07 Feb 2024 12:19 )   PT: 11.4 sec;   INR: 1.01 ratio         PTT - ( 07 Feb 2024 12:19 )  PTT:28.4 sec  Urinalysis Basic - ( 07 Feb 2024 12:19 )    Color: x / Appearance: x / SG: x / pH: x  Gluc: 118 mg/dL / Ketone: x  / Bili: x / Urobili: x   Blood: x / Protein: x / Nitrite: x   Leuk Esterase: x / RBC: x / WBC x   Sq Epi: x / Non Sq Epi: x / Bacteria: x      CAPILLARY BLOOD GLUCOSE          RADIOLOGY & ADDITIONAL STUDIES:    EKG:   Personally Reviewed:  [x] YES     Imaging:   Personally Reviewed:  [x] YES               [ ] Consultant(s) Notes Reviewed  [x] Care Discussed with Consultants/Other Providers: 
Casa Delvalle MD  Cardiology Fellow  [phone]  All Cardiology service information can be found 24/7 on [email or web address], password: [email or web address]    Patient seen and evaluated at bedside    HPI:  89 y/o male with a PMH of hypothyroidism (on synthroid), BPH (on finasteride), HTN, Depression (on Lexapro), Aortic stenosis s/p TAVR and pacemaker placement (7/2023), CAD s/p CABG (1988) PCI most recently 8/2023, and PADpresenting with nonhealing foot wound, with abnormal AMERICO/PVRs. Vascular planning an angiogram, cardiology consulted for pre-op risk stratification. Patient with no dyspnea or chest pain, able to walk up 2 flights of stairs. Taking all his medications.     Cardiac Meds: atenlol 25, plavix 75, atorva 20, synthroid 50, lisin 20    Allergies:  No Known Allergies      Current Medications:   acetaminophen     Tablet .. 650 milliGRAM(s) Oral every 6 hours PRN  finasteride 5 milliGRAM(s) Oral daily  heparin   Injectable 5000 Unit(s) SubCutaneous every 8 hours        REVIEW OF SYSTEMS:  CONSTITUTIONAL: No weakness, fevers or chills  EYES/ENT: No visual changes;  No dysphagia  NECK: No pain or stiffness  RESPIRATORY: No cough, wheezing, hemoptysis; No shortness of breath  CARDIOVASCULAR: No chest pain or palpitations; No lower extremity edema  GASTROINTESTINAL: No abdominal or epigastric pain. No nausea, vomiting, or hematemesis; No diarrhea or constipation. No melena or hematochezia.  BACK: No back pain  GENITOURINARY: No dysuria, frequency or hematuria  NEUROLOGICAL: No numbness or weakness  SKIN: No itching, burning, rashes, or lesions   All other review of systems is negative unless indicated above.    Physical Exam:  T(F): --  HR: 66 (02-07) (66 - 66)  BP: 161/69 (02-07) (161/69 - 161/69)  RR: 17 (02-07)  SpO2: 97% (02-07)  GEN: NAD  HEENT: EOMI, clear sclera  PULM: CTA b/l, no wheeze  CV: RRR S1 S2, no murmur, no JVD  ABD: S, NT, ND  EXT: left foot dressed  PSYCH: normal affect  SKIN: No rash    CXR: Personally reviewed    Labs: Personally reviewed

## 2024-02-07 NOTE — CONSULT NOTE ADULT - ASSESSMENT
89 y/o male with a PMH of hypothyroidism (on synthroid), BPH (on finasteride), HTN, Depression (on Lexapro), Aortic stenosis s/p TAVR and pacemaker placement (7/2023), CAD s/p CABG (1988) PCI most recently 8/2023, and PAD presenting for foot wound and planning for angiogram. Patient is able to perform >4 METs, overall at a moderate nic-operative risk of MACE, but for a low risk procedure. Patient is euvolemic, otherwise optimized.     Recs:  -Obtain TTE  -interrogate pacemaker for events  -no other cardiac testing indicated prior to angiogram  -c/w ASA, plavix, atorva, atenolol 25, lisin 20

## 2024-02-07 NOTE — CONSULT NOTE ADULT - ASSESSMENT
89M with a PMH of hypothyroidism (on synthroid), BPH (on finasteride), HTN, Depression (on Lexapro), Aortic stenosis s/p TAVR and pacemaker placement (7/2023), CAD s/p CABG (1988), and PAD now admitted for further workup of PAD.     # PAD   - abnormal AMERICO/PVRs in the office  - planned for angiogram per vascular   - c/w DAPT, atorvastatin     # Preoperative evaluation - currently with METS>4, RCRI of 1 for CAD which correlates with 6.0 % risk of MACE. Patient is at moderate risk for upcoming procedure. Would obtain cardiology clearance and TTE prior OR.    # HTN  - BP slightly above goal on initial labs   - c/w home meds for now - HCTZ 12.5, lisinopril 20, atenolol 25mg  - hold HCTZ day of angiogram  - continue to monitor BP    # CAD s/p CABG (1988)  - c/w ASA and plavix   - c/w atorvastatin 20 mg     # Hypothyroid - c/w home levothyroxine   # Depression - c/w home lexapro 5 mg   # BPH - c/w home proscar 0.5 89M with a PMH of hypothyroidism (on synthroid), BPH (on finasteride), HTN, Depression (on Lexapro), Aortic stenosis s/p TAVR and pacemaker placement (7/2023), CAD s/p CABG (1988), and PAD now admitted for further workup of PAD.     # PAD   - abnormal AMERICO/PVRs in the office  - planned for angiogram per vascular   - c/w DAPT, atorvastatin     # Preoperative evaluation - currently with METS>4, RCRI of 1 for CAD which correlates with 6.0 % risk of MACE. Patient is at moderate risk for upcoming procedure. Would obtain cardiology clearance, PPM interrogation TTE prior OR.    # HTN  - BP slightly above goal on initial labs   - c/w home meds for now - HCTZ 12.5, lisinopril 20, atenolol 25mg  - hold HCTZ day of angiogram  - continue to monitor BP    # CAD s/p CABG (1988)  - c/w ASA and plavix   - c/w atorvastatin 20 mg     # AS s/p TAVR - TTE pending   # Hypothyroid - c/w home levothyroxine   # Depression - c/w home lexapro 5 mg   # BPH - c/w home proscar 0.5

## 2024-02-07 NOTE — H&P ADULT - HISTORY OF PRESENT ILLNESS
Patient is a 89 y/o male with a PMH of hypothyroidism (on synthroid), BPH (on finasteride), HTN, Depression (on Lexapro), Aortic stenosis s/p TAVR and pacemaker placement (7/2023), CAD s/p CABG (1988), and PAD. Patient was seen in Dr. Arevalo's office last week with a non-healing right first toe wound and associated tingling, which has progressively worsened since Nov. Denies any rest pain or claudication with ambulating. Endorses intermittent foot pain 2/2 to plantar fascitis. AMERICO/PVR obtained in the office were reported by the patient to be abnormal. Presents to the hospital for a scheduled(2/8/2023) angiogram and possible angioplasty/stent placement with Dr. Arevalo. At this time patient denies any CP, SOB, or N/V.    Patient follows with Dr. Harmon ([place], [place]) for his previous CABG and Dr. Ortiz ([place]) for their previous TAVR and subsequent pacemaker placement.

## 2024-02-07 NOTE — H&P ADULT - ASSESSMENT
89 y/o male with a PMH of hypothyroidism (on synthroid), BPH (on finasteride), HTN, Depression (on Lexapro), Aortic stenosis s/p TAVR and pacemaker placement (7/2023), CAD s/p CABG (1988), and PAD. Was seen by Dr. Arevalo in the office last week, found to have a nonhealing right 1st toe ulcer and abnormal AMERICO/PVRs. Presents to [facility] for a scheduled RLE angiogram (2/8/2024).    Plan:  - Cardiology risk stratification and medical optimization prior to OR  - c/w home meds  - NPO@ midnight  - am preop labs  - c/w home meds    Vascular Surgery  [id number] 89 y/o male with a PMH of hypothyroidism (on synthroid), BPH (on finasteride), HTN, Depression (on Lexapro), Aortic stenosis s/p TAVR and pacemaker placement (7/2023), CAD s/p CABG (1988), and PAD. Was seen by Dr. Arevalo in the office last week, found to have a nonhealing right 1st toe ulcer and abnormal AMERICO/PVRs. Presents to [facility] for a scheduled RLE angiogram (2/8/2024).    Plan:  - Cardiology risk stratification and medical optimization prior to OR  - c/w home meds  - NPO@ midnight  - am preop labs    Vascular Surgery  [id number]

## 2024-02-07 NOTE — H&P ADULT - ATTENDING COMMENTS
89M p/w non-healing right foot wound in setting of PAD. Pedal pulses non-palpable. Has CKD. Admitted for IV hydration prior to angiography scheduled for 2/8/24.   -Medical optimization  -Antiplatelet and statin  -IV hydration

## 2024-02-08 ENCOUNTER — APPOINTMENT (OUTPATIENT)
Dept: VASCULAR SURGERY | Facility: HOSPITAL | Age: 89
End: 2024-02-08

## 2024-02-08 ENCOUNTER — TRANSCRIPTION ENCOUNTER (OUTPATIENT)
Age: 89
End: 2024-02-08

## 2024-02-08 VITALS
HEART RATE: 68 BPM | RESPIRATION RATE: 18 BRPM | OXYGEN SATURATION: 100 % | SYSTOLIC BLOOD PRESSURE: 148 MMHG | TEMPERATURE: 98 F | DIASTOLIC BLOOD PRESSURE: 71 MMHG

## 2024-02-08 DIAGNOSIS — I35.0 NONRHEUMATIC AORTIC (VALVE) STENOSIS: ICD-10-CM

## 2024-02-08 DIAGNOSIS — E03.9 HYPOTHYROIDISM, UNSPECIFIED: ICD-10-CM

## 2024-02-08 DIAGNOSIS — F32.9 MAJOR DEPRESSIVE DISORDER, SINGLE EPISODE, UNSPECIFIED: ICD-10-CM

## 2024-02-08 LAB
ANION GAP SERPL CALC-SCNC: 7 MMOL/L — SIGNIFICANT CHANGE UP (ref 7–14)
APTT BLD: 33.1 SEC — SIGNIFICANT CHANGE UP (ref 24.5–35.6)
BLD GP AB SCN SERPL QL: NEGATIVE — SIGNIFICANT CHANGE UP
BUN SERPL-MCNC: 32 MG/DL — HIGH (ref 7–23)
CALCIUM SERPL-MCNC: 8.1 MG/DL — LOW (ref 8.4–10.5)
CHLORIDE SERPL-SCNC: 102 MMOL/L — SIGNIFICANT CHANGE UP (ref 98–107)
CO2 SERPL-SCNC: 28 MMOL/L — SIGNIFICANT CHANGE UP (ref 22–31)
CREAT SERPL-MCNC: 1.61 MG/DL — HIGH (ref 0.5–1.3)
EGFR: 40 ML/MIN/1.73M2 — LOW
GLUCOSE SERPL-MCNC: 105 MG/DL — HIGH (ref 70–99)
HCT VFR BLD CALC: 32.8 % — LOW (ref 39–50)
HGB BLD-MCNC: 10.8 G/DL — LOW (ref 13–17)
INR BLD: 1.1 RATIO — SIGNIFICANT CHANGE UP (ref 0.85–1.18)
MAGNESIUM SERPL-MCNC: 2.2 MG/DL — SIGNIFICANT CHANGE UP (ref 1.6–2.6)
MCHC RBC-ENTMCNC: 31 PG — SIGNIFICANT CHANGE UP (ref 27–34)
MCHC RBC-ENTMCNC: 32.9 GM/DL — SIGNIFICANT CHANGE UP (ref 32–36)
MCV RBC AUTO: 94.3 FL — SIGNIFICANT CHANGE UP (ref 80–100)
NRBC # BLD: 0 /100 WBCS — SIGNIFICANT CHANGE UP (ref 0–0)
NRBC # FLD: 0 K/UL — SIGNIFICANT CHANGE UP (ref 0–0)
PHOSPHATE SERPL-MCNC: 3.3 MG/DL — SIGNIFICANT CHANGE UP (ref 2.5–4.5)
PLATELET # BLD AUTO: 135 K/UL — LOW (ref 150–400)
POTASSIUM SERPL-MCNC: 4 MMOL/L — SIGNIFICANT CHANGE UP (ref 3.5–5.3)
POTASSIUM SERPL-SCNC: 4 MMOL/L — SIGNIFICANT CHANGE UP (ref 3.5–5.3)
PROTHROM AB SERPL-ACNC: 12.3 SEC — SIGNIFICANT CHANGE UP (ref 9.5–13)
RBC # BLD: 3.48 M/UL — LOW (ref 4.2–5.8)
RBC # FLD: 13.8 % — SIGNIFICANT CHANGE UP (ref 10.3–14.5)
RH IG SCN BLD-IMP: POSITIVE — SIGNIFICANT CHANGE UP
SODIUM SERPL-SCNC: 137 MMOL/L — SIGNIFICANT CHANGE UP (ref 135–145)
WBC # BLD: 6.19 K/UL — SIGNIFICANT CHANGE UP (ref 3.8–10.5)
WBC # FLD AUTO: 6.19 K/UL — SIGNIFICANT CHANGE UP (ref 3.8–10.5)

## 2024-02-08 PROCEDURE — 99152 MOD SED SAME PHYS/QHP 5/>YRS: CPT

## 2024-02-08 PROCEDURE — 36246 INS CATH ABD/L-EXT ART 2ND: CPT

## 2024-02-08 PROCEDURE — 75710 ARTERY X-RAYS ARM/LEG: CPT | Mod: 26,RT

## 2024-02-08 PROCEDURE — 75625 CONTRAST EXAM ABDOMINL AORTA: CPT | Mod: 26

## 2024-02-08 PROCEDURE — 76937 US GUIDE VASCULAR ACCESS: CPT | Mod: 26

## 2024-02-08 PROCEDURE — 99232 SBSQ HOSP IP/OBS MODERATE 35: CPT

## 2024-02-08 RX ORDER — ACETAMINOPHEN 500 MG
2 TABLET ORAL
Qty: 0 | Refills: 0 | DISCHARGE
Start: 2024-02-08

## 2024-02-08 RX ORDER — SODIUM CHLORIDE 9 MG/ML
500 INJECTION INTRAMUSCULAR; INTRAVENOUS; SUBCUTANEOUS
Refills: 0 | Status: DISCONTINUED | OUTPATIENT
Start: 2024-02-08 | End: 2024-02-08

## 2024-02-08 RX ADMIN — CLOPIDOGREL BISULFATE 75 MILLIGRAM(S): 75 TABLET, FILM COATED ORAL at 07:17

## 2024-02-08 RX ADMIN — LISINOPRIL 20 MILLIGRAM(S): 2.5 TABLET ORAL at 12:16

## 2024-02-08 RX ADMIN — HEPARIN SODIUM 5000 UNIT(S): 5000 INJECTION INTRAVENOUS; SUBCUTANEOUS at 05:34

## 2024-02-08 RX ADMIN — ATENOLOL 25 MILLIGRAM(S): 25 TABLET ORAL at 06:36

## 2024-02-08 RX ADMIN — FINASTERIDE 5 MILLIGRAM(S): 5 TABLET, FILM COATED ORAL at 12:16

## 2024-02-08 RX ADMIN — Medication 50 MICROGRAM(S): at 05:39

## 2024-02-08 RX ADMIN — SODIUM CHLORIDE 100 MILLILITER(S): 9 INJECTION INTRAMUSCULAR; INTRAVENOUS; SUBCUTANEOUS at 00:27

## 2024-02-08 RX ADMIN — Medication 81 MILLIGRAM(S): at 07:18

## 2024-02-08 RX ADMIN — ESCITALOPRAM OXALATE 5 MILLIGRAM(S): 10 TABLET, FILM COATED ORAL at 12:16

## 2024-02-08 RX ADMIN — SODIUM CHLORIDE 75 MILLILITER(S): 9 INJECTION INTRAMUSCULAR; INTRAVENOUS; SUBCUTANEOUS at 10:20

## 2024-02-08 NOTE — PROGRESS NOTE ADULT - SUBJECTIVE AND OBJECTIVE BOX
C Team Surgery Daily Progress Note    Subjective:   Patient seen at bedside this AM. Reports feeling well, without complaints.    24h Events:   - Overnight, no acute events    Objective:  Vital Signs  T(C): 36.7 (02-08 @ 06:34), Max: 37.1 (02-08 @ 01:28)  HR: 65 (02-08 @ 06:34) (63 - 66)  BP: 134/51 (02-08 @ 06:34) (122/53 - 161/69)  RR: 18 (02-08 @ 06:34) (17 - 18)  SpO2: 95% (02-08 @ 06:34) (95% - 99%)  02-07-24 @ 07:01  -  02-08-24 @ 07:00  --------------------------------------------------------  IN:  Total IN: 0 mL    OUT:    Voided (mL): 1190 mL  Total OUT: 1190 mL    Total NET: -1190 mL          Physical Exam:  GEN: resting in bed comfortably in NAD  RESP: no increased WOB  EXTR: FROM, wwp. Motor and sensation intact in b/l LE.  VASCULAR:    RLE - Dopplerable PT/DP signals  NEURO: AAOx4    Labs:                        10.8   6.19  )-----------( 135      ( 08 Feb 2024 02:25 )             32.8   02-08    137  |  102  |  32<H>  ----------------------------<  105<H>  4.0   |  28  |  1.61<H>    Ca    8.1<L>      08 Feb 2024 02:25  Phos  3.3     02-08  Mg     2.20     02-08      CAPILLARY BLOOD GLUCOSE          Medications:   MEDICATIONS  (STANDING):  aspirin enteric coated 81 milliGRAM(s) Oral daily  atenolol  Tablet 25 milliGRAM(s) Oral daily  atorvastatin 20 milliGRAM(s) Oral at bedtime  clopidogrel Tablet 75 milliGRAM(s) Oral daily  escitalopram 5 milliGRAM(s) Oral daily  finasteride 5 milliGRAM(s) Oral daily  heparin   Injectable 5000 Unit(s) SubCutaneous every 8 hours  levothyroxine 50 MICROGram(s) Oral daily  lisinopril 20 milliGRAM(s) Oral daily  sodium chloride 0.9%. 1000 milliLiter(s) (100 mL/Hr) IV Continuous <Continuous>    MEDICATIONS  (PRN):  acetaminophen     Tablet .. 650 milliGRAM(s) Oral every 6 hours PRN Mild Pain (1 - 3), Moderate Pain (4 - 6)      Imaging:      
Patient is a 89y old  Male who presents with a chief complaint of Non -healing right toe wound (08 Feb 2024 09:51)      SUBJECTIVE / OVERNIGHT EVENTS: patient seen and examined by bedside in recovery room s/p angiogram pt tolerated procedure well, denies headache, dizziness, SOB, CP, Palpitations , N/V/D, abdominal pain      MEDICATIONS  (STANDING):  aspirin enteric coated 81 milliGRAM(s) Oral daily  atenolol  Tablet 25 milliGRAM(s) Oral daily  atorvastatin 20 milliGRAM(s) Oral at bedtime  clopidogrel Tablet 75 milliGRAM(s) Oral daily  escitalopram 5 milliGRAM(s) Oral daily  finasteride 5 milliGRAM(s) Oral daily  heparin   Injectable 5000 Unit(s) SubCutaneous every 8 hours  levothyroxine 50 MICROGram(s) Oral daily  lisinopril 20 milliGRAM(s) Oral daily  sodium chloride 0.9%. 500 milliLiter(s) (75 mL/Hr) IV Continuous <Continuous>    MEDICATIONS  (PRN):  acetaminophen     Tablet .. 650 milliGRAM(s) Oral every 6 hours PRN Mild Pain (1 - 3), Moderate Pain (4 - 6)      Vital Signs Last 24 Hrs  T(C): 36.6 (08 Feb 2024 11:31), Max: 37.1 (08 Feb 2024 01:28)  T(F): 97.9 (08 Feb 2024 11:31), Max: 98.8 (08 Feb 2024 01:28)  HR: 68 (08 Feb 2024 11:31) (63 - 68)  BP: 148/71 (08 Feb 2024 11:31) (122/53 - 148/71)  BP(mean): --  RR: 18 (08 Feb 2024 11:31) (18 - 18)  SpO2: 100% (08 Feb 2024 11:31) (95% - 100%)    Parameters below as of 08 Feb 2024 11:31  Patient On (Oxygen Delivery Method): room air      CAPILLARY BLOOD GLUCOSE        I&O's Summary    07 Feb 2024 07:01  -  08 Feb 2024 07:00  --------------------------------------------------------  IN: 540 mL / OUT: 1190 mL / NET: -650 mL        PHYSICAL EXAM:  GENERAL: NAD,  HEAD:  Atraumatic, Normocephalic  EYES: EOMI, PERRLA, conjunctiva and sclera clear  CHEST/LUNG: Clear to auscultation bilaterally; No wheeze  HEART: Regular rate and rhythm; No murmurs, rubs, or gallops  ABDOMEN: Soft, Nontender, Nondistended; Bowel sounds present  EXTREMITIES:   right hallux toe wound, bandaged; No c/c/e; warm to touch, left groin angiogram site with dressing, C/D/I   PSYCH: AAOx3  NEUROLOGY: non-focal      LABS:                        10.8   6.19  )-----------( 135      ( 08 Feb 2024 02:25 )             32.8     02-08    137  |  102  |  32<H>  ----------------------------<  105<H>  4.0   |  28  |  1.61<H>    Ca    8.1<L>      08 Feb 2024 02:25  Phos  3.3     02-08  Mg     2.20     02-08      PT/INR - ( 08 Feb 2024 02:25 )   PT: 12.3 sec;   INR: 1.10 ratio         PTT - ( 08 Feb 2024 02:25 )  PTT:33.1 sec      Urinalysis Basic - ( 08 Feb 2024 02:25 )    Color: x / Appearance: x / SG: x / pH: x  Gluc: 105 mg/dL / Ketone: x  / Bili: x / Urobili: x   Blood: x / Protein: x / Nitrite: x   Leuk Esterase: x / RBC: x / WBC x   Sq Epi: x / Non Sq Epi: x / Bacteria: x        RADIOLOGY & ADDITIONAL TESTS:  < from: Cardiac Catheterization (02.08.24 @ 08:17) >  Ultrasound guided access of left common femoral artery. CO2  angiography with final catheterposition right right common  femoral artery.    FIndings: CFA stenosis. Short segment distal SFA stenosis; Occluded  peroneal artery, diseased PT, small vessel disease in  foot with dominant AT runoff.     < end of copied text >  < from: TTE W or WO Ultrasound Enhancing Agent (02.07.24 @ 14:25) >     CONCLUSIONS:      1. Left ventricular cavity is normal. Left ventricular wall thickness is normal. Left ventricular systolic function is normal with an ejection fraction of 56 % by Thomas's method of disks.   2. Normal left ventricular diastolic function.   3. Normal right ventricular cavity size and normal systolic function.   4. Structurally normal mitral valve with normal leaflet excursion.   5. There is calcification of the mitral valve annulus.   6. Mild mitral regurgitation.   7. A transcatheter deployed (TAVR) (valve-in-valve) is present in the aortic position. There is trace intravalvular regurgitation. The peak transaortic velocity is 1.48 m/s, peak transaortic gradient is 8.8 mmHg and mean transaortic gradient is 5.0 mmHg with an LVOT/aortic valve VTI ratio of 0.55.   8. Estimated pulmonary artery systolic pressure is 30 mmHg.   9. No pericardial effusion seen.    ___________________________________________    < end of copied text >    Imaging Personally Reviewed:    Consultant(s) Notes Reviewed:  vascular     Care Discussed with Consultants/Other Providers: vascular

## 2024-02-08 NOTE — DISCHARGE NOTE PROVIDER - CARE PROVIDER_API CALL
Inge Arevalo  Vascular Surgery  [address] [place] [address]  Phone: [phone]  Fax: [phone]  Follow Up Time: 1 week

## 2024-02-08 NOTE — PROGRESS NOTE ADULT - ASSESSMENT
89M with a PMH of hypothyroidism (on synthroid), BPH (on finasteride), HTN, Depression (on Lexapro), Aortic stenosis s/p TAVR and pacemaker placement (7/2023), CAD s/p CABG (1988), and PAD now admitted for further workup of PAD.     Plan:  -Angiogram today  -Diet  -Pain regimen  -Dispo pending    Vascular surgery  [id number]
89M with a PMH of hypothyroidism (on synthroid), BPH (on finasteride), HTN, Depression (on Lexapro), Aortic stenosis s/p TAVR and pacemaker placement (7/2023), CAD s/p CABG (1988), and PAD now admitted for further workup of PAD.

## 2024-02-08 NOTE — DISCHARGE NOTE NURSING/CASE MANAGEMENT/SOCIAL WORK - NSDCPEFALRISK_GEN_ALL_CORE
For information on Fall & Injury Prevention, visit: [email or web address] OR  [email or web address] OR  https://www.cdc.gov/steadi/patient.html

## 2024-02-08 NOTE — PROGRESS NOTE ADULT - PROBLEM SELECTOR PLAN 1
- abnormal AMERICO/PVRs in the office  - s/p  angiogram today, results noted as above , CFA stenosis. Short segment distal SFA stenosis; Occluded  peroneal artery, diseased PT, small vessel disease in foot with dominant AT runoff.   c/w DAPT, atorvastatin   - will f/u further Vascular recs

## 2024-02-08 NOTE — DISCHARGE NOTE PROVIDER - NSDCFUSCHEDAPPT_GEN_ALL_CORE_FT
Cj Carlos Physician [facility]  [address]  Scheduled Appointment: 03/07/2024    Cj Carlos  [facility]  [address]  Scheduled Appointment: 04/15/2024

## 2024-02-08 NOTE — DISCHARGE NOTE PROVIDER - HOSPITAL COURSE
89 y/o male with a PMH of hypothyroidism (on synthroid), BPH (on finasteride), HTN, Depression (on Lexapro), Aortic stenosis s/p TAVR and pacemaker placement (7/2023), CAD s/p CABG (1988), and PAD. Patient was seen in Dr. Arevalo's office last week with a non-healing right first toe wound and associated tingling, which has progressively worsened since Nov. Denies any rest pain or claudication with ambulating. Endorses intermittent foot pain 2/2 to plantar fascitis. AMERICO/PVR obtained in the office were reported by the patient to be abnormal. Presents to the hospital for a scheduled(2/8/2023) angiogram and possible angioplasty/stent placement with Dr. Arevalo. At this time patient denies any CP, SOB, or N/V.    Patient follows with Dr. Harmon ([place], [place]) for his previous CABG and Dr. Ortiz ([place]) for their previous TAVR and subsequent pacemaker placement.     Patient was admitted and cleared by medicine and cardiology to proceed with surgery. Patient underwent diagnostic right lower extremity angiogram on 2/8/24 with Dr. Arevalo. Patient tolerated operation well and there were no post-operative complications identified. Patient remained hemodynamically stable in the PACU and transferred to the surgical floor. At this time, patient is currently ambulating, voiding, tolerating a regular diet. Pain well controlled on PO pain meds. Patient has been deemed stable for discharge home with follow up as an outpatient.

## 2024-02-08 NOTE — DISCHARGE NOTE PROVIDER - NSDCCPCAREPLAN_GEN_ALL_CORE_FT
PRINCIPAL DISCHARGE DIAGNOSIS  Diagnosis: PAD (peripheral artery disease)  Assessment and Plan of Treatment: WOUND CARE:  Keep wound clean and dry. Do not scrub or rub incisions. May remove groin dressing in 48 hours. Do not use lotion or powder on incisions.  BATHING: Please do not submerge wound underwater. You may shower and/or sponge bathe.  ACTIVITY: No heavy lifting or straining. Otherwise, you may return to your usual level of physical activity. If you are taking narcotic pain medication (such as Percocet) DO NOT drive a car, operate machinery or make important decisions.  DIET: Return to your usual diet.  NOTIFY YOUR SURGEON IF: You have any bleeding that does not stop, any pus draining from your wound(s), any fever (over 100.4 F) or chills, persistent nausea/vomiting, persistent diarrhea, or if your pain is not controlled on your discharge pain medications.  FOLLOW-UP: Please follow up with your primary care physician in one week regarding your hospitalization   Please schedule an outpatient appointment with Dr. Arevalo in 1 week after discharge.

## 2024-02-08 NOTE — DISCHARGE NOTE PROVIDER - NSDCCPTREATMENT_GEN_ALL_CORE_FT
PRINCIPAL PROCEDURE  Procedure: Angiogram, lower extremity, unilateral  Findings and Treatment:

## 2024-02-08 NOTE — DISCHARGE NOTE NURSING/CASE MANAGEMENT/SOCIAL WORK - PATIENT PORTAL LINK FT
You can access the FollowMyHealth Patient Portal offered by [facility] by registering at the following website: [email or web address]. By joining [employer]’s FollowMyHealth portal, you will also be able to view your health information using other applications (apps) compatible with our system.

## 2024-02-08 NOTE — PROGRESS NOTE ADULT - PROBLEM SELECTOR PLAN 2
- /53 - 148/71  - c/w home meds for now - HCTZ 12.5, lisinopril 20, atenolol 25mg  - hold HCTZ day of angiogram  - continue to monitor BP

## 2024-02-08 NOTE — PROGRESS NOTE ADULT - PROBLEM SELECTOR PLAN 3
CAD s/p CABG (1988), no cp or palpitations at this time   - c/w ASA and plavix   - c/w atorvastatin 20 mg

## 2024-02-08 NOTE — DISCHARGE NOTE PROVIDER - NSDCMRMEDTOKEN_GEN_ALL_CORE_FT
acetaminophen 325 mg oral tablet: 2 tab(s) orally every 6 hours As needed Mild Pain (1 - 3), Moderate Pain (4 - 6)  aspirin 81 mg oral tablet: orally once a day  atenolol 25 mg oral tablet: 1 tab(s) orally once a day  atorvastatin 20 mg oral tablet: 1 tab(s) orally once a day  clopidogrel 75 mg oral tablet: 1 tab(s) orally once a day  levothyroxine 50 mcg (0.05 mg) oral tablet: 1 tab(s) orally once a day  Lexapro 5 mg oral tablet: 1 tab(s) orally once a day  lisinopril-hydrochlorothiazide 20 mg-12.5 mg oral tablet: 1 tab(s) orally once a day  Proscar 5 mg oral tablet: 1 tab(s) orally once a day

## 2024-02-12 ENCOUNTER — APPOINTMENT (OUTPATIENT)
Dept: VASCULAR SURGERY | Facility: CLINIC | Age: 89
End: 2024-02-12
Payer: MEDICARE

## 2024-02-12 VITALS
WEIGHT: 160 LBS | DIASTOLIC BLOOD PRESSURE: 73 MMHG | HEART RATE: 71 BPM | TEMPERATURE: 97.7 F | HEIGHT: 68 IN | SYSTOLIC BLOOD PRESSURE: 152 MMHG | BODY MASS INDEX: 24.25 KG/M2

## 2024-02-12 DIAGNOSIS — Z78.9 OTHER SPECIFIED HEALTH STATUS: ICD-10-CM

## 2024-02-12 PROCEDURE — 99213 OFFICE O/P EST LOW 20 MIN: CPT

## 2024-02-12 NOTE — ASSESSMENT
[FreeTextEntry1] : In summary, Mr. 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 Angiography findings were discussed with the [facility]min family and all questions were answered. We will schedule him for right femoral endarterectomy and right leg angiography on 2/21/24. He should continue DAPT and statin.

## 2024-02-12 NOTE — PHYSICAL EXAM
[de-identified] : On physical examination the patient is in no acute distress and neurologically intact. The lungs are clear to auscultation and the heart has a regular rate and rhythm. Abdomen is benign. Bilateral femoral pulses are palpable. Pedal pulses are non-palpable. Rigth distal first toe 1cm ulcer.

## 2024-02-12 NOTE — HISTORY OF PRESENT ILLNESS
[FreeTextEntry1] : I have just had the pleasure of seeing Mr. Lex Solorio in consultation from Dr. Jerry for right lower extremity arterial insufficiency.   Mr. Solorio is a 89-year-old gentleman who presents with a three month history of non-healing right first toe wound. He was previously placed on antibiotics. He is followed by Dr. Jerry from podiatry. He has not had any non-invasive testing. He denies any symptoms of lower extremity claudication. He reports rest pain in the distal right foot. He has not had any prior vascular surgical intervention on his lower extremities. He underwent PCI/TAVR (bovine) at [facility] in July 2023. He denies any history of CVA or TIA. He denies any history of CRI or DM although per bloodwork from last week Creatinine is 1.47.  His medical history is otherwise significant for remote CABG x 4 with left GSV harvest, HTN, HLD, hypothyroidism, and bilateral macular degeneration and retinal tears.  Medications: Atenolol, Lipitor, Plavix, Aspirin, Lexapro, Lisinopril, Proscar and Synthroid.  Allergies: NDKA  Social history: Non-smoker  FH: [place]   [de-identified] : 2/12/24: Mr. Solorio presents s/p RLE angiography for PAD with tissue loss. He is accompanied by his wife and two children. Angiography on 2/1/24 showed right common femoral artery stenosis, distal SFA stenosis and tibial disease. No intervention was performed due to the right CFA lesion. He denies any access site complications. He is taking DAPT and statin.

## 2024-02-13 PROBLEM — I73.9 PAD (PERIPHERAL ARTERY DISEASE): Status: ACTIVE | Noted: 2024-02-13

## 2024-02-13 NOTE — HISTORY OF PRESENT ILLNESS
[FreeTextEntry1] : 89 y/o M w/ PMHx of CAD, PAD presents with right great toe wound. Has been followed by Dr. Jerry in private office for wound care. 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 Pt f/u with Dr. Arevalo who9 performed non-invasive vascular studies, recommending RLE angiogram after pre-operative work up.

## 2024-02-13 NOTE — PLAN
[FreeTextEntry1] : 89 y/o M w/ PMHx CAD, PAD with right great toe ischemic ulcer - Distal right great toe ulcer 1cm x 1cm, well adhered dry central eschar - AMERICO/PVR performed today, noncompressible vessels with decreased toe pressures on the right - Vasc plan for RLE angiogram date tbd - Ordered ESR, CRP, CBC to be completed with vasc pre-operative labs - No intervention currently planned from podiatry standpoint, local wound care - Continue Mupirocin and a band aid daily - F/u with Dr. Jerry at private office in [place]

## 2024-02-15 ENCOUNTER — OUTPATIENT (OUTPATIENT)
Dept: OUTPATIENT SERVICES | Facility: HOSPITAL | Age: 89
LOS: 1 days | End: 2024-02-15
Payer: MEDICARE

## 2024-02-15 VITALS
DIASTOLIC BLOOD PRESSURE: 76 MMHG | SYSTOLIC BLOOD PRESSURE: 130 MMHG | OXYGEN SATURATION: 99 % | RESPIRATION RATE: 16 BRPM | WEIGHT: 158.07 LBS | HEART RATE: 66 BPM | TEMPERATURE: 97 F | HEIGHT: 66 IN

## 2024-02-15 DIAGNOSIS — I73.9 PERIPHERAL VASCULAR DISEASE, UNSPECIFIED: ICD-10-CM

## 2024-02-15 DIAGNOSIS — Z98.890 OTHER SPECIFIED POSTPROCEDURAL STATES: Chronic | ICD-10-CM

## 2024-02-15 DIAGNOSIS — E03.9 HYPOTHYROIDISM, UNSPECIFIED: ICD-10-CM

## 2024-02-15 DIAGNOSIS — F41.9 ANXIETY DISORDER, UNSPECIFIED: ICD-10-CM

## 2024-02-15 DIAGNOSIS — Z87.438 PERSONAL HISTORY OF OTHER DISEASES OF MALE GENITAL ORGANS: ICD-10-CM

## 2024-02-15 DIAGNOSIS — Z95.1 PRESENCE OF AORTOCORONARY BYPASS GRAFT: Chronic | ICD-10-CM

## 2024-02-15 DIAGNOSIS — I10 ESSENTIAL (PRIMARY) HYPERTENSION: ICD-10-CM

## 2024-02-15 DIAGNOSIS — Z95.2 PRESENCE OF PROSTHETIC HEART VALVE: Chronic | ICD-10-CM

## 2024-02-15 LAB
ANION GAP SERPL CALC-SCNC: 9 MMOL/L — SIGNIFICANT CHANGE UP (ref 7–14)
BLD GP AB SCN SERPL QL: NEGATIVE — SIGNIFICANT CHANGE UP
BUN SERPL-MCNC: 34 MG/DL — HIGH (ref 7–23)
CALCIUM SERPL-MCNC: 9 MG/DL — SIGNIFICANT CHANGE UP (ref 8.4–10.5)
CHLORIDE SERPL-SCNC: 102 MMOL/L — SIGNIFICANT CHANGE UP (ref 98–107)
CO2 SERPL-SCNC: 30 MMOL/L — SIGNIFICANT CHANGE UP (ref 22–31)
CREAT SERPL-MCNC: 1.56 MG/DL — HIGH (ref 0.5–1.3)
EGFR: 41 ML/MIN/1.73M2 — LOW
GLUCOSE SERPL-MCNC: 102 MG/DL — HIGH (ref 70–99)
HCT VFR BLD CALC: 36.1 % — LOW (ref 39–50)
HGB BLD-MCNC: 11.9 G/DL — LOW (ref 13–17)
MCHC RBC-ENTMCNC: 31 PG — SIGNIFICANT CHANGE UP (ref 27–34)
MCHC RBC-ENTMCNC: 33 GM/DL — SIGNIFICANT CHANGE UP (ref 32–36)
MCV RBC AUTO: 94 FL — SIGNIFICANT CHANGE UP (ref 80–100)
NRBC # BLD: 0 /100 WBCS — SIGNIFICANT CHANGE UP (ref 0–0)
NRBC # FLD: 0 K/UL — SIGNIFICANT CHANGE UP (ref 0–0)
PLATELET # BLD AUTO: 189 K/UL — SIGNIFICANT CHANGE UP (ref 150–400)
POTASSIUM SERPL-MCNC: 4.7 MMOL/L — SIGNIFICANT CHANGE UP (ref 3.5–5.3)
POTASSIUM SERPL-SCNC: 4.7 MMOL/L — SIGNIFICANT CHANGE UP (ref 3.5–5.3)
RBC # BLD: 3.84 M/UL — LOW (ref 4.2–5.8)
RBC # FLD: 14.1 % — SIGNIFICANT CHANGE UP (ref 10.3–14.5)
RH IG SCN BLD-IMP: POSITIVE — SIGNIFICANT CHANGE UP
SODIUM SERPL-SCNC: 141 MMOL/L — SIGNIFICANT CHANGE UP (ref 135–145)
WBC # BLD: 8.95 K/UL — SIGNIFICANT CHANGE UP (ref 3.8–10.5)
WBC # FLD AUTO: 8.95 K/UL — SIGNIFICANT CHANGE UP (ref 3.8–10.5)

## 2024-02-15 PROCEDURE — 93010 ELECTROCARDIOGRAM REPORT: CPT

## 2024-02-15 RX ORDER — FINASTERIDE 5 MG/1
1 TABLET, FILM COATED ORAL
Refills: 0 | DISCHARGE

## 2024-02-15 RX ORDER — LISINOPRIL/HYDROCHLOROTHIAZIDE 10-12.5 MG
1 TABLET ORAL
Refills: 0 | DISCHARGE

## 2024-02-15 RX ORDER — ATORVASTATIN CALCIUM 80 MG/1
1 TABLET, FILM COATED ORAL
Refills: 0 | DISCHARGE

## 2024-02-15 RX ORDER — LEVOTHYROXINE SODIUM 125 MCG
1 TABLET ORAL
Refills: 0 | DISCHARGE

## 2024-02-15 RX ORDER — CLOPIDOGREL BISULFATE 75 MG/1
1 TABLET, FILM COATED ORAL
Refills: 0 | DISCHARGE

## 2024-02-15 RX ORDER — ASPIRIN/CALCIUM CARB/MAGNESIUM 324 MG
1 TABLET ORAL
Refills: 0 | DISCHARGE

## 2024-02-15 RX ORDER — ASPIRIN/CALCIUM CARB/MAGNESIUM 324 MG
0 TABLET ORAL
Refills: 0 | DISCHARGE

## 2024-02-15 RX ORDER — SODIUM CHLORIDE 9 MG/ML
1000 INJECTION INTRAMUSCULAR; INTRAVENOUS; SUBCUTANEOUS
Refills: 0 | Status: DISCONTINUED | OUTPATIENT
Start: 2024-02-21 | End: 2024-02-22

## 2024-02-15 RX ORDER — ATENOLOL 25 MG/1
1 TABLET ORAL
Refills: 0 | DISCHARGE

## 2024-02-15 RX ORDER — ESCITALOPRAM OXALATE 10 MG/1
2 TABLET, FILM COATED ORAL
Refills: 0 | DISCHARGE

## 2024-02-15 NOTE — H&P PST ADULT - CARDIOVASCULAR COMMENTS
Pacemaker left upper chest Hx PVD with right foot numbness and tingling - right big toe wound for 3 months - dressing in place - pt had failed stenting at [facility] and now for surgery - hx AVR/TAVR 7/23 and CAD with 1 stent placed 8/23 - pt had Pacemaker placed shortly after TAVR

## 2024-02-15 NOTE — H&P PST ADULT - NSICDXPASTSURGICALHX_GEN_ALL_CORE_FT
PAST SURGICAL HISTORY:  S/P CABG (coronary artery bypass graft)     S/P TAVR (transcatheter aortic valve replacement)      PAST SURGICAL HISTORY:  H/O coronary angiogram     S/P CABG (coronary artery bypass graft)     S/P TAVR (transcatheter aortic valve replacement)

## 2024-02-15 NOTE — H&P PST ADULT - NSICDXPASTMEDICALHX_GEN_ALL_CORE_FT
PAST MEDICAL HISTORY:  History of BPH     Hypothyroid      PAST MEDICAL HISTORY:  Anxiety and depression     Aortic stenosis     CAD (coronary artery disease)     History of BPH     Hypothyroid     PVD (peripheral vascular disease)

## 2024-02-15 NOTE — H&P PST ADULT - PATIENT'S GENDER IDENTITY
Outpatient Progress Note      CHIEF COMPLAINT:    Chief Complaint   Patient presents with   • Vaginal Discharge   • STI Screening        HISTORY OF PRESENT ILLNESS:  The patient is a 26 year old female who presents with complaints of vaginal discharge.  Started 1 week ago.  Onset was sudden and unchanged.  Severity described as moderate.  Symptoms occurring constantly.  Discharge described as white with odor.  No known alleviating or exacerbating factors.  Associated symptoms include none.  No history of sexually transmitted infections (STIs).  No new sexual partners.  One current sexual partner, male.  No recent antibiotics.    Past Medical History:   Past Medical History:   Diagnosis Date   • NO HX OF        Past Surgical History:   History reviewed. No pertinent surgical history.    Current Medications:    Current Outpatient Medications   Medication Sig Dispense Refill   • Norethindrone Acet-Ethinyl Est (JUNEL 1/20 PO)        No current facility-administered medications for this visit.        Allergies:    ALLERGIES:   Allergen Reactions   • No Known Drug Allergy        SOCIAL HISTORY:  Social History     Tobacco Use   • Smoking status: Never Smoker   • Smokeless tobacco: Never Used   Substance Use Topics   • Alcohol use: Yes     Comment: social   • Drug use: Yes     Comment: marijuana socially         Drug Use:    Yes                Comment: marijuana socially      FAMILY HISTORY:  Family History   Problem Relation Age of Onset   • Diabetes Maternal Grandmother        REVIEW OF SYSTEMS:   CONSTITUTIONAL:  No Fevers/Chills.  HEAD/EARS/NOSE/THROAT/MOUTH:  No Oral lesions/Sore throat.  NECK:  No Swelling/Masses.  RESPIRATORY:  No Cough/Sputum.  GASTROINTESTINAL:  No Abdominal pain/Nausea/Vomiting/Diarrhea/Constipation.  GENITOURINARY:  No Dysuria/Frequency/Urgency/Hematuria  INTEGUMENTARY:  No Rashes/Pruritus.  HEMATOLOGIC/LYMPHATIC:  No Enlarged lymph nodes.  MUSCULOSKELETAL:  No Joint pain/Joint swelling/Joint  redness.    PHYSICAL EXAM:   Visit Vitals  /78   Pulse 79   Temp 98.7 °F (37.1 °C)   Ht 5' 3\" (1.6 m)   Wt 84.9 kg   LMP 07/14/2019 (Exact Date)   SpO2 99%   BMI 33.16 kg/m²       CONSTITUTIONAL:  No acute distress, Non-toxic appearing.  GENITOURINARY:  External vulvovaginal region, labia, clitoris, periurethral area, urethral orifice, and vaginal canal are clear.  Normal vaginal mucosa. Cervix is pink with no discharge or lesions seen.  No cervical motion tenderness. White discharge seen.  No odors detected.  MUSCULOSKELETAL:  No Clubbing/Cyanosis/Edema  SKIN:  Warm, Dry, Inspection normal with no Rashes/Lesions/Ulcers.  PSYCHIATRIC:  Alert and Oriented x 3.  Able to articulate well with normal speech/language, rate, volume and coherence.  Recent and remote memory intact.  The patient's mood and affect are described as not anxious or depressed.  Associations are intact.  Judgment and insight are appropriate concerning matters relevant to self.        ASSESSMENT AND PLAN:   (Z11.3) Screen for STD (sexually transmitted disease)  (primary encounter diagnosis)  Comment: will f/u w/results and treatment plan  Plan: CHLAMYDIA/GC BY NUCLEIC ACID AMPLIFICATION, HIV        ANTIGEN/ANTIBODY SCREEN, RPR, WET MOUNT            (N89.8) Vaginal discharge  Comment: will f/u w/results and treatment plan  Plan: WET MOUNT            HM UTD       Male

## 2024-02-15 NOTE — H&P PST ADULT - FUNCTIONAL STATUS
4-10 METS METs DASI >4 - pt able to walk several blocks and go up 1 flight stairs, shopping/4-10 METS

## 2024-02-15 NOTE — H&P PST ADULT - HISTORY OF PRESENT ILLNESS
Pt is a  Pt is a 89 yr old male scheduled for Right Femoral Endarterectomy and right Leg Angiogram with dr Arevalo tentatively 2/21/24 - pt with hx PVD / non-healing right first toe wound with tingling and numbness right foot - pt seen at [facility] for attempted stent but now for surgery - Hx CABG 1988, TAVR and pacemaker placement 7/23 and CAD stent 8/23 - pt on Plavix / ASA - pt seen by Cardion 2/7/24 while in [facility] - note in chart - pt hx hypothyroid, depression , HTN - pt has some difficulty with giving complete MH

## 2024-02-15 NOTE — H&P PST ADULT - PROBLEM SELECTOR PLAN 1
Pt scheduled for surgery and preop instructions including instructions for taking Famotidine on the day of surgery, given verbally and with use of  written materials, and patient confirming understanding of such instructions using  teach back method.  Chart reviewed with Dr Pineda - Cardiac consult  document from 2/7/24 in chart - no further evals needed   Pt to remain  on Plavix and ASA preop as per Dr Arevalo

## 2024-02-20 ENCOUNTER — TRANSCRIPTION ENCOUNTER (OUTPATIENT)
Age: 89
End: 2024-02-20

## 2024-02-20 DIAGNOSIS — I73.9 PERIPHERAL VASCULAR DISEASE, UNSPECIFIED: ICD-10-CM

## 2024-02-20 DIAGNOSIS — L97.512 NON-PRESSURE CHRONIC ULCER OF OTHER PART OF RIGHT FOOT WITH FAT LAYER EXPOSED: ICD-10-CM

## 2024-02-20 PROBLEM — E03.9 HYPOTHYROIDISM, UNSPECIFIED: Chronic | Status: ACTIVE | Noted: 2024-02-15

## 2024-02-20 PROBLEM — F41.9 ANXIETY DISORDER, UNSPECIFIED: Chronic | Status: ACTIVE | Noted: 2024-02-15

## 2024-02-20 PROBLEM — I25.10 ATHEROSCLEROTIC HEART DISEASE OF NATIVE CORONARY ARTERY WITHOUT ANGINA PECTORIS: Chronic | Status: ACTIVE | Noted: 2024-02-15

## 2024-02-20 PROBLEM — I35.0 NONRHEUMATIC AORTIC (VALVE) STENOSIS: Chronic | Status: ACTIVE | Noted: 2024-02-15

## 2024-02-20 PROBLEM — Z87.438 PERSONAL HISTORY OF OTHER DISEASES OF MALE GENITAL ORGANS: Chronic | Status: ACTIVE | Noted: 2024-02-15

## 2024-02-20 NOTE — ASU PATIENT PROFILE, ADULT - NSICDXPASTSURGICALHX_GEN_ALL_CORE_FT
PAST SURGICAL HISTORY:  H/O coronary angiogram     S/P CABG (coronary artery bypass graft)     S/P TAVR (transcatheter aortic valve replacement)

## 2024-02-20 NOTE — ASU PATIENT PROFILE, ADULT - FALL HARM RISK - RISK INTERVENTIONS
Assistance OOB with selected safe patient handling equipment/Assistance with ambulation/Communicate Fall Risk and Risk Factors to all staff, patient, and family/Discuss with provider need for PT consult/Monitor gait and stability/Reinforce activity limits and safety measures with patient and family/Visual Cue: Yellow wristband/Bed in lowest position, wheels locked, appropriate side rails in place/Call bell, personal items and telephone in reach/Instruct patient to call for assistance before getting out of bed or chair/Non-slip footwear when patient is out of bed/[place] to call system/Physically safe environment - no spills, clutter or unnecessary equipment/Purposeful Proactive Rounding/Room/bathroom lighting operational, light cord in reach

## 2024-02-20 NOTE — ASU PATIENT PROFILE, ADULT - NSICDXPASTMEDICALHX_GEN_ALL_CORE_FT
PAST MEDICAL HISTORY:  Anxiety and depression     Aortic stenosis     CAD (coronary artery disease)     History of BPH     Hypothyroid     PVD (peripheral vascular disease)

## 2024-02-21 ENCOUNTER — APPOINTMENT (OUTPATIENT)
Dept: VASCULAR SURGERY | Facility: HOSPITAL | Age: 89
End: 2024-02-21

## 2024-02-21 ENCOUNTER — INPATIENT (INPATIENT)
Facility: HOSPITAL | Age: 89
LOS: 1 days | Discharge: HOME | End: 2024-02-23
Attending: SURGERY | Admitting: SURGERY
Payer: MEDICARE

## 2024-02-21 ENCOUNTER — RESULT REVIEW (OUTPATIENT)
Age: 89
End: 2024-02-21

## 2024-02-21 VITALS
OXYGEN SATURATION: 100 % | DIASTOLIC BLOOD PRESSURE: 48 MMHG | WEIGHT: 158.07 LBS | TEMPERATURE: 98 F | RESPIRATION RATE: 16 BRPM | SYSTOLIC BLOOD PRESSURE: 119 MMHG | HEIGHT: 66 IN | HEART RATE: 61 BPM

## 2024-02-21 DIAGNOSIS — I73.9 PERIPHERAL VASCULAR DISEASE, UNSPECIFIED: ICD-10-CM

## 2024-02-21 DIAGNOSIS — Z98.890 OTHER SPECIFIED POSTPROCEDURAL STATES: Chronic | ICD-10-CM

## 2024-02-21 DIAGNOSIS — Z95.1 PRESENCE OF AORTOCORONARY BYPASS GRAFT: Chronic | ICD-10-CM

## 2024-02-21 DIAGNOSIS — Z95.2 PRESENCE OF PROSTHETIC HEART VALVE: Chronic | ICD-10-CM

## 2024-02-21 LAB
ADD ON TEST-SPECIMEN IN LAB: SIGNIFICANT CHANGE UP
ALBUMIN SERPL ELPH-MCNC: 2 G/DL — LOW (ref 3.3–5)
ANION GAP SERPL CALC-SCNC: 7 MMOL/L — SIGNIFICANT CHANGE UP (ref 7–14)
BLOOD GAS ARTERIAL - LYTES,HGB,ICA,LACT RESULT: SIGNIFICANT CHANGE UP
BLOOD GAS ARTERIAL - LYTES,HGB,ICA,LACT RESULT: SIGNIFICANT CHANGE UP
BUN SERPL-MCNC: 23 MG/DL — SIGNIFICANT CHANGE UP (ref 7–23)
CALCIUM SERPL-MCNC: 6 MG/DL — CRITICAL LOW (ref 8.4–10.5)
CHLORIDE SERPL-SCNC: 117 MMOL/L — HIGH (ref 98–107)
CO2 SERPL-SCNC: 20 MMOL/L — LOW (ref 22–31)
CREAT SERPL-MCNC: 1 MG/DL — SIGNIFICANT CHANGE UP (ref 0.5–1.3)
EGFR: 71 ML/MIN/1.73M2 — SIGNIFICANT CHANGE UP
GAS PNL BLDA: SIGNIFICANT CHANGE UP
GLUCOSE SERPL-MCNC: 135 MG/DL — HIGH (ref 70–99)
HCT VFR BLD CALC: 28.1 % — LOW (ref 39–50)
HGB BLD-MCNC: 9.6 G/DL — LOW (ref 13–17)
MAGNESIUM SERPL-MCNC: 1.5 MG/DL — LOW (ref 1.6–2.6)
MCHC RBC-ENTMCNC: 31.6 PG — SIGNIFICANT CHANGE UP (ref 27–34)
MCHC RBC-ENTMCNC: 34.2 GM/DL — SIGNIFICANT CHANGE UP (ref 32–36)
MCV RBC AUTO: 92.4 FL — SIGNIFICANT CHANGE UP (ref 80–100)
PHOSPHATE SERPL-MCNC: 3 MG/DL — SIGNIFICANT CHANGE UP (ref 2.5–4.5)
PLATELET # BLD AUTO: 89 K/UL — LOW (ref 150–400)
POTASSIUM SERPL-MCNC: 3.6 MMOL/L — SIGNIFICANT CHANGE UP (ref 3.5–5.3)
POTASSIUM SERPL-SCNC: 3.6 MMOL/L — SIGNIFICANT CHANGE UP (ref 3.5–5.3)
RBC # BLD: 3.04 M/UL — LOW (ref 4.2–5.8)
RBC # FLD: 14.4 % — SIGNIFICANT CHANGE UP (ref 10.3–14.5)
SODIUM SERPL-SCNC: 144 MMOL/L — SIGNIFICANT CHANGE UP (ref 135–145)
WBC # BLD: 10.38 K/UL — SIGNIFICANT CHANGE UP (ref 3.8–10.5)
WBC # FLD AUTO: 10.38 K/UL — SIGNIFICANT CHANGE UP (ref 3.8–10.5)

## 2024-02-21 PROCEDURE — 35371 RECHANNELING OF ARTERY: CPT | Mod: RT

## 2024-02-21 PROCEDURE — 88311 DECALCIFY TISSUE: CPT | Mod: 26

## 2024-02-21 PROCEDURE — 97607 NEG PRS WND THR NDME<=50SQCM: CPT

## 2024-02-21 PROCEDURE — 88304 TISSUE EXAM BY PATHOLOGIST: CPT | Mod: 26

## 2024-02-21 DEVICE — GWIRE VASC ENTRY MINISTICK MAX 4FRX10CM: Type: IMPLANTABLE DEVICE | Site: RIGHT | Status: FUNCTIONAL

## 2024-02-21 DEVICE — PATCH VASC PERIPHERAL 1X8CM: Type: IMPLANTABLE DEVICE | Site: RIGHT | Status: FUNCTIONAL

## 2024-02-21 DEVICE — SURGICEL 2 X 14": Type: IMPLANTABLE DEVICE | Site: RIGHT | Status: FUNCTIONAL

## 2024-02-21 DEVICE — CATH ANGIO GLIDECATH ANGLE TAPER 5FR X 65CM: Type: IMPLANTABLE DEVICE | Site: RIGHT | Status: FUNCTIONAL

## 2024-02-21 DEVICE — GWIRE ANGL .035X180 STD: Type: IMPLANTABLE DEVICE | Site: RIGHT | Status: FUNCTIONAL

## 2024-02-21 DEVICE — GUIDEWIRE TERUMO GLIDEWIRE ANGLED .035" X 150CM STANDARD: Type: IMPLANTABLE DEVICE | Site: RIGHT | Status: FUNCTIONAL

## 2024-02-21 DEVICE — KIT A-LINE 1LUM 20G X 12CM SAFE KIT: Type: IMPLANTABLE DEVICE | Site: RIGHT | Status: FUNCTIONAL

## 2024-02-21 DEVICE — CLIP APPLIER COVIDIEN SURGICLIP 11.5" MEDIUM: Type: IMPLANTABLE DEVICE | Site: RIGHT | Status: FUNCTIONAL

## 2024-02-21 DEVICE — CLIP APPLIER COVIDIEN SURGICLIP III 9" SM: Type: IMPLANTABLE DEVICE | Site: RIGHT | Status: FUNCTIONAL

## 2024-02-21 DEVICE — SURGICEL FIBRILLAR 2 X 4": Type: IMPLANTABLE DEVICE | Site: RIGHT | Status: FUNCTIONAL

## 2024-02-21 DEVICE — SHEATH INTRODUCER TERUMO PINNACLE PERIPHERAL 5FR X 10CM X 0.035" MINI WIRE: Type: IMPLANTABLE DEVICE | Site: RIGHT | Status: FUNCTIONAL

## 2024-02-21 DEVICE — SURGIFLO MATRIX WITH THROMBIN KIT: Type: IMPLANTABLE DEVICE | Site: RIGHT | Status: FUNCTIONAL

## 2024-02-21 DEVICE — SURGICEL NU-KNIT 6 X 9": Type: IMPLANTABLE DEVICE | Site: RIGHT | Status: FUNCTIONAL

## 2024-02-21 DEVICE — SURGIFOAM PAD 8CM X 12.5CM X 2MM (100C): Type: IMPLANTABLE DEVICE | Site: RIGHT | Status: FUNCTIONAL

## 2024-02-21 RX ORDER — ATENOLOL 25 MG/1
1 TABLET ORAL
Refills: 0 | DISCHARGE

## 2024-02-21 RX ORDER — ESCITALOPRAM OXALATE 10 MG/1
5 TABLET, FILM COATED ORAL DAILY
Refills: 0 | Status: DISCONTINUED | OUTPATIENT
Start: 2024-02-22 | End: 2024-02-23

## 2024-02-21 RX ORDER — ONDANSETRON 8 MG/1
4 TABLET, FILM COATED ORAL ONCE
Refills: 0 | Status: DISCONTINUED | OUTPATIENT
Start: 2024-02-21 | End: 2024-02-22

## 2024-02-21 RX ORDER — HYDROMORPHONE HYDROCHLORIDE 2 MG/ML
0.5 INJECTION INTRAMUSCULAR; INTRAVENOUS; SUBCUTANEOUS
Refills: 0 | Status: DISCONTINUED | OUTPATIENT
Start: 2024-02-21 | End: 2024-02-21

## 2024-02-21 RX ORDER — ATENOLOL 25 MG/1
25 TABLET ORAL DAILY
Refills: 0 | Status: DISCONTINUED | OUTPATIENT
Start: 2024-02-21 | End: 2024-02-23

## 2024-02-21 RX ORDER — HEPARIN SODIUM 5000 [USP'U]/ML
5000 INJECTION INTRAVENOUS; SUBCUTANEOUS EVERY 8 HOURS
Refills: 0 | Status: DISCONTINUED | OUTPATIENT
Start: 2024-02-22 | End: 2024-02-23

## 2024-02-21 RX ORDER — ATORVASTATIN CALCIUM 80 MG/1
20 TABLET, FILM COATED ORAL AT BEDTIME
Refills: 0 | Status: DISCONTINUED | OUTPATIENT
Start: 2024-02-22 | End: 2024-02-23

## 2024-02-21 RX ORDER — ACETAMINOPHEN 500 MG
1000 TABLET ORAL EVERY 6 HOURS
Refills: 0 | Status: DISCONTINUED | OUTPATIENT
Start: 2024-02-21 | End: 2024-02-22

## 2024-02-21 RX ORDER — HYDROMORPHONE HYDROCHLORIDE 2 MG/ML
0.5 INJECTION INTRAMUSCULAR; INTRAVENOUS; SUBCUTANEOUS
Refills: 0 | Status: DISCONTINUED | OUTPATIENT
Start: 2024-02-21 | End: 2024-02-22

## 2024-02-21 RX ORDER — LEVOTHYROXINE SODIUM 125 MCG
50 TABLET ORAL DAILY
Refills: 0 | Status: DISCONTINUED | OUTPATIENT
Start: 2024-02-22 | End: 2024-02-23

## 2024-02-21 RX ORDER — OXYCODONE HYDROCHLORIDE 5 MG/1
5 TABLET ORAL ONCE
Refills: 0 | Status: DISCONTINUED | OUTPATIENT
Start: 2024-02-21 | End: 2024-02-22

## 2024-02-21 RX ORDER — FINASTERIDE 5 MG/1
1 TABLET, FILM COATED ORAL
Refills: 0 | DISCHARGE

## 2024-02-21 RX ORDER — FENTANYL CITRATE 50 UG/ML
25 INJECTION INTRAVENOUS
Refills: 0 | Status: DISCONTINUED | OUTPATIENT
Start: 2024-02-21 | End: 2024-02-22

## 2024-02-21 RX ORDER — CLOPIDOGREL BISULFATE 75 MG/1
75 TABLET, FILM COATED ORAL DAILY
Refills: 0 | Status: DISCONTINUED | OUTPATIENT
Start: 2024-02-22 | End: 2024-02-23

## 2024-02-21 RX ORDER — CLOPIDOGREL BISULFATE 75 MG/1
1 TABLET, FILM COATED ORAL
Refills: 0 | DISCHARGE

## 2024-02-21 RX ORDER — FINASTERIDE 5 MG/1
5 TABLET, FILM COATED ORAL DAILY
Refills: 0 | Status: DISCONTINUED | OUTPATIENT
Start: 2024-02-22 | End: 2024-02-23

## 2024-02-21 RX ORDER — OXYCODONE HYDROCHLORIDE 5 MG/1
5 TABLET ORAL EVERY 6 HOURS
Refills: 0 | Status: DISCONTINUED | OUTPATIENT
Start: 2024-02-21 | End: 2024-02-23

## 2024-02-21 RX ORDER — LEVOTHYROXINE SODIUM 125 MCG
1 TABLET ORAL
Refills: 0 | DISCHARGE

## 2024-02-21 RX ORDER — ATORVASTATIN CALCIUM 80 MG/1
1 TABLET, FILM COATED ORAL
Refills: 0 | DISCHARGE

## 2024-02-21 RX ORDER — ESCITALOPRAM OXALATE 10 MG/1
1 TABLET, FILM COATED ORAL
Refills: 0 | DISCHARGE

## 2024-02-21 RX ORDER — CALCIUM CARBONATE 500(1250)
1 TABLET ORAL ONCE
Refills: 0 | Status: COMPLETED | OUTPATIENT
Start: 2024-02-21 | End: 2024-02-21

## 2024-02-21 RX ORDER — OXYCODONE HYDROCHLORIDE 5 MG/1
2.5 TABLET ORAL EVERY 6 HOURS
Refills: 0 | Status: DISCONTINUED | OUTPATIENT
Start: 2024-02-21 | End: 2024-02-23

## 2024-02-21 RX ORDER — ASPIRIN/CALCIUM CARB/MAGNESIUM 324 MG
81 TABLET ORAL DAILY
Refills: 0 | Status: DISCONTINUED | OUTPATIENT
Start: 2024-02-22 | End: 2024-02-23

## 2024-02-21 RX ADMIN — Medication 1 TABLET(S): at 23:22

## 2024-02-21 RX ADMIN — ATENOLOL 25 MILLIGRAM(S): 25 TABLET ORAL at 20:15

## 2024-02-21 RX ADMIN — Medication 400 MILLIGRAM(S): at 21:30

## 2024-02-21 RX ADMIN — Medication 1000 MILLIGRAM(S): at 21:55

## 2024-02-21 NOTE — BRIEF OPERATIVE NOTE - OPERATION/FINDINGS
right common femoral endarterectomy  right lower extremity diagnostic angiogram, AT and PT runoff to the ankle, poor run-off to the foot

## 2024-02-21 NOTE — CHART NOTE - NSCHARTNOTEFT_GEN_A_CORE
POST-OP NOTE:    Procedure: right common femoral endarterectomy  right lower extremity diagnostic angiogram    Subjective: Patient assessed at bedside approx 4 hours post-surgery. Reports no pain, no N/V, no changes in strength or sensation of the b/l lower extremities, no CP/SOB. Urinating via earl, passing gas, no other concerns reported.     Vital Signs Last 24 Hrs  T(C): 36.6 (22 Feb 2024 00:00), Max: 36.6 (21 Feb 2024 20:00)  T(F): 97.9 (22 Feb 2024 00:00), Max: 97.9 (21 Feb 2024 20:00)  HR: 60 (22 Feb 2024 01:00) (60 - 61)  BP: 113/31 (22 Feb 2024 01:00) (88/38 - 145/50)  BP(mean): 50 (22 Feb 2024 01:00) (50 - 74)  RR: 15 (22 Feb 2024 01:00) (12 - 20)  SpO2: 100% (22 Feb 2024 01:00) (97% - 100%)    Parameters below as of 22 Feb 2024 00:00  Patient On (Oxygen Delivery Method): room air      I&O's Summary    21 Feb 2024 07:01  -  22 Feb 2024 01:36  --------------------------------------------------------  IN: 330 mL / OUT: 285 mL / NET: 45 mL                            9.6    10.38 )-----------( 89       ( 21 Feb 2024 19:15 )             28.1     02-21    144  |  117<H>  |  23  ----------------------------<  135<H>  3.6   |  20<L>  |  1.00    Ca    6.0<LL>      21 Feb 2024 19:15  Phos  3.0     02-21  Mg     1.50     02-21    TPro  x   /  Alb  2.0<L>  /  TBili  x   /  DBili  x   /  AST  x   /  ALT  x   /  AlkPhos  x   02-21       PHYSICAL EXAM:  Gen: NAD, well-developed  Resp: breathing easily, no stridor  CV: no JVD  Abd: soft, non-tender to palpation at all quadrants  Extremities: B/l AT/PT dopplerable, moving both extremities; left groin soft, non-tender to palpation with dressing c/d/i; Right groin soft, non-tender to palpation, prevena vacc suctioning appropriately  Neuro: AOx4    A/P: 89 yo M with PMHx of hypothyroidism, HTN, PAD, now s/p R. femoral endarterectomy, R. leg angiogram 2/21/24. Recovering approrpriately on floor.    -Neurovascular check  -Analgesia  -Prevena vacc  -Advance diet as tolerated  -Home Rx  -DVT ppx and DAPT resume in AM    Vascular surgery  [id number]

## 2024-02-22 ENCOUNTER — TRANSCRIPTION ENCOUNTER (OUTPATIENT)
Age: 89
End: 2024-02-22

## 2024-02-22 LAB
ANION GAP SERPL CALC-SCNC: 11 MMOL/L — SIGNIFICANT CHANGE UP (ref 7–14)
BUN SERPL-MCNC: 29 MG/DL — HIGH (ref 7–23)
CALCIUM SERPL-MCNC: 7.6 MG/DL — LOW (ref 8.4–10.5)
CHLORIDE SERPL-SCNC: 106 MMOL/L — SIGNIFICANT CHANGE UP (ref 98–107)
CO2 SERPL-SCNC: 24 MMOL/L — SIGNIFICANT CHANGE UP (ref 22–31)
CREAT SERPL-MCNC: 1.48 MG/DL — HIGH (ref 0.5–1.3)
EGFR: 44 ML/MIN/1.73M2 — LOW
GLUCOSE SERPL-MCNC: 128 MG/DL — HIGH (ref 70–99)
HCT VFR BLD CALC: 28.6 % — LOW (ref 39–50)
HGB BLD-MCNC: 9.6 G/DL — LOW (ref 13–17)
MAGNESIUM SERPL-MCNC: 2 MG/DL — SIGNIFICANT CHANGE UP (ref 1.6–2.6)
MCHC RBC-ENTMCNC: 31.4 PG — SIGNIFICANT CHANGE UP (ref 27–34)
MCHC RBC-ENTMCNC: 33.6 GM/DL — SIGNIFICANT CHANGE UP (ref 32–36)
MCV RBC AUTO: 93.5 FL — SIGNIFICANT CHANGE UP (ref 80–100)
NRBC # BLD: 0 /100 WBCS — SIGNIFICANT CHANGE UP (ref 0–0)
NRBC # FLD: 0 K/UL — SIGNIFICANT CHANGE UP (ref 0–0)
PHOSPHATE SERPL-MCNC: 4 MG/DL — SIGNIFICANT CHANGE UP (ref 2.5–4.5)
PLATELET # BLD AUTO: 111 K/UL — LOW (ref 150–400)
POTASSIUM SERPL-MCNC: 4.3 MMOL/L — SIGNIFICANT CHANGE UP (ref 3.5–5.3)
POTASSIUM SERPL-SCNC: 4.3 MMOL/L — SIGNIFICANT CHANGE UP (ref 3.5–5.3)
RBC # BLD: 3.06 M/UL — LOW (ref 4.2–5.8)
RBC # FLD: 15.3 % — HIGH (ref 10.3–14.5)
SODIUM SERPL-SCNC: 141 MMOL/L — SIGNIFICANT CHANGE UP (ref 135–145)
WBC # BLD: 11.92 K/UL — HIGH (ref 3.8–10.5)
WBC # FLD AUTO: 11.92 K/UL — HIGH (ref 3.8–10.5)

## 2024-02-22 RX ORDER — ACETAMINOPHEN 500 MG
975 TABLET ORAL EVERY 6 HOURS
Refills: 0 | Status: DISCONTINUED | OUTPATIENT
Start: 2024-02-22 | End: 2024-02-23

## 2024-02-22 RX ADMIN — HEPARIN SODIUM 5000 UNIT(S): 5000 INJECTION INTRAVENOUS; SUBCUTANEOUS at 14:38

## 2024-02-22 RX ADMIN — HEPARIN SODIUM 5000 UNIT(S): 5000 INJECTION INTRAVENOUS; SUBCUTANEOUS at 06:20

## 2024-02-22 RX ADMIN — ATORVASTATIN CALCIUM 20 MILLIGRAM(S): 80 TABLET, FILM COATED ORAL at 21:09

## 2024-02-22 RX ADMIN — CLOPIDOGREL BISULFATE 75 MILLIGRAM(S): 75 TABLET, FILM COATED ORAL at 14:37

## 2024-02-22 RX ADMIN — Medication 975 MILLIGRAM(S): at 20:15

## 2024-02-22 RX ADMIN — Medication 50 MICROGRAM(S): at 06:21

## 2024-02-22 RX ADMIN — Medication 1000 MILLIGRAM(S): at 06:50

## 2024-02-22 RX ADMIN — Medication 975 MILLIGRAM(S): at 14:40

## 2024-02-22 RX ADMIN — ESCITALOPRAM OXALATE 5 MILLIGRAM(S): 10 TABLET, FILM COATED ORAL at 14:37

## 2024-02-22 RX ADMIN — FINASTERIDE 5 MILLIGRAM(S): 5 TABLET, FILM COATED ORAL at 14:36

## 2024-02-22 RX ADMIN — Medication 400 MILLIGRAM(S): at 06:20

## 2024-02-22 RX ADMIN — Medication 81 MILLIGRAM(S): at 14:37

## 2024-02-22 RX ADMIN — HEPARIN SODIUM 5000 UNIT(S): 5000 INJECTION INTRAVENOUS; SUBCUTANEOUS at 21:09

## 2024-02-22 RX ADMIN — SODIUM CHLORIDE 30 MILLILITER(S): 9 INJECTION INTRAMUSCULAR; INTRAVENOUS; SUBCUTANEOUS at 02:33

## 2024-02-22 NOTE — DIETITIAN INITIAL EVALUATION ADULT - OTHER INFO
Patient is currently ordered for a PO diet. Patient reports a good appetite and PO intake at meals during course of admission. Patient denies any chewing or swallowing difficulty with current diet order. No report of GI distress (nausea, vomiting, diarrhea, constipation). No BMs noted per RN flowsheet documentation. Not noted to be on a bowel regimen.     Writer provided verbal education regarding current diet order and nutrition recommendations for after discharge, including a heart healthy dietary pattern. Patient verbalized understanding to the discussion.

## 2024-02-22 NOTE — DISCHARGE NOTE PROVIDER - NSDCMRMEDTOKEN_GEN_ALL_CORE_FT
aspirin 81 mg po daily:   atenolol 25 mg oral tablet: 1 tab(s) orally once a day am  atorvastatin 20 mg oral tablet: 1 tab(s) orally once a day  clopidogrel 75 mg oral tablet: 1 tab(s) orally once a day  Lexapro 5 mg oral tablet: 1 tab(s) orally once a day  lisinopril-hydrochlorothiazide 20 mg-12.5 mg oral tablet: 1 tab(s) orally once a day  Proscar 5 mg oral tablet: 1 tab(s) orally once a day  Synthroid 50 mcg (0.05 mg) oral tablet: 1 tab(s) orally once a day   acetaminophen 325 mg oral tablet: 3 tab(s) orally every 6 hours  aspirin 81 mg oral delayed release tablet: 1 tab(s) orally once a day  aspirin 81 mg po daily:   atenolol 25 mg oral tablet: 1 tab(s) orally once a day am  atorvastatin 20 mg oral tablet: 1 tab(s) orally once a day  clopidogrel 75 mg oral tablet: 1 tab(s) orally once a day  Lexapro 5 mg oral tablet: 1 tab(s) orally once a day  Physical therapy: Outpatient physical therapy as needed  Proscar 5 mg oral tablet: 1 tab(s) orally once a day  Synthroid 50 mcg (0.05 mg) oral tablet: 1 tab(s) orally once a day   aspirin 81 mg oral delayed release tablet: 1 tab(s) orally once a day  atenolol 25 mg oral tablet: 1 tab(s) orally once a day am  atorvastatin 20 mg oral tablet: 1 tab(s) orally once a day  cephalexin 500 mg oral capsule: 1 cap(s) orally every 12 hours  clopidogrel 75 mg oral tablet: 1 tab(s) orally once a day  Lexapro 5 mg oral tablet: 1 tab(s) orally once a day  Physical therapy: Outpatient physical therapy as needed  Proscar 5 mg oral tablet: 1 tab(s) orally once a day  Synthroid 50 mcg (0.05 mg) oral tablet: 1 tab(s) orally once a day

## 2024-02-22 NOTE — PATIENT PROFILE ADULT - FUNCTIONAL ASSESSMENT - DAILY ACTIVITY 2.
[Mother] : mother [] :  [Time Spent: ____ minutes] : Total time spent using  services: [unfilled] minutes. The patient's primary language is not English thus required  services. [Interpreters_IDNumber] : [id number] [Interpreters_FullName] : Suraj [TWNoteComboBox1] : Miguelangel 3 = A little assistance

## 2024-02-22 NOTE — DISCHARGE NOTE PROVIDER - NSDCFUSCHEDAPPT_GEN_ALL_CORE_FT
Cj Carlos Physician [facility]  OPHTHALM [address]  Scheduled Appointment: 03/07/2024    Cj Carlos  [place] [facility]  OPHTHALM [address]  Scheduled Appointment: 04/15/2024     Cj Carlos [facility]  OPHTHALM [address]  Scheduled Appointment: 03/18/2024    Cj Carlos  [place] [facility]  OPHTHALM [address]  Scheduled Appointment: 04/15/2024

## 2024-02-22 NOTE — DISCHARGE NOTE PROVIDER - NSDCCPCAREPLAN_GEN_ALL_CORE_FT
PRINCIPAL DISCHARGE DIAGNOSIS  Diagnosis: PVD (peripheral vascular disease)  Assessment and Plan of Treatment: WOUND CARE:  Keep wound clean and dry. Do not scrub or rub incisions. Do not use lotion or powder on incisions.  BATHING: Please do not submerge wound underwater. You may shower and/or sponge bathe.  ACTIVITY: No heavy lifting or straining. Otherwise, you may return to your usual level of physical activity. If you are taking narcotic pain medication (such as Percocet) DO NOT drive a car, operate machinery or make important decisions.  DIET: Return to your usual diet.  NOTIFY YOUR SURGEON IF: You have any bleeding that does not stop, any pus draining from your wound(s), any fever (over 100.4 F) or chills, persistent nausea/vomiting, persistent diarrhea, or if your pain is not controlled on your discharge pain medications.  FOLLOW-UP: Please follow up with your primary care physician in one week regarding your hospitalization     PRINCIPAL DISCHARGE DIAGNOSIS  Diagnosis: PVD (peripheral vascular disease)  Assessment and Plan of Treatment: WOUND CARE:  Keep wound clean and dry. Do not scrub or rub incisions. Do not use lotion or powder on incisions.  BATHING: Please do not submerge wound underwater. You may shower and/or sponge bathe.  ACTIVITY: No heavy lifting or straining. Otherwise, you may return to your usual level of physical activity. If you are taking narcotic pain medication (such as Percocet) DO NOT drive a car, operate machinery or make important decisions.  DIET: Return to your usual diet.  NOTIFY YOUR SURGEON IF: You have any bleeding that does not stop, any pus draining from your wound(s), any fever (over 100.4 F) or chills, persistent nausea/vomiting, persistent diarrhea, or if your pain is not controlled on your discharge pain medications.  FOLLOW-UP: Please follow up with your primary care physician in one week regarding your hospitalization  Please follow up with Dr. Arevalo in the office in 2 weeks.  Please follow up with Dr. Jerry, Podiatrist in the office in 1-2 weeks.     PRINCIPAL DISCHARGE DIAGNOSIS  Diagnosis: PVD (peripheral vascular disease)  Assessment and Plan of Treatment: WOUND CARE:  Keep wound clean and dry. Do not scrub or rub incisions. May remove left groin dressing tomorrow. Do not use lotion or powder on incisions.  BATHING: Please do not submerge wound underwater. You may shower and/or sponge bathe.  ACTIVITY: No heavy lifting or straining. Otherwise, you may return to your usual level of physical activity. If you are taking narcotic pain medication (such as Percocet) DO NOT drive a car, operate machinery or make important decisions.  DIET: Return to your usual diet.  NOTIFY YOUR SURGEON IF: You have any bleeding that does not stop, any pus draining from your wound(s), any fever (over 100.4 F) or chills, persistent nausea/vomiting, persistent diarrhea, or if your pain is not controlled on your discharge pain medications.  FOLLOW-UP: Please follow up with your primary care physician in one week regarding your hospitalization  Please follow up with Dr. Arevalo in the office in 2 weeks.  Please follow up with Dr. Jerry, Podiatrist in the office in 1-2 weeks.

## 2024-02-22 NOTE — PATIENT PROFILE ADULT - FALL HARM RISK - HARM RISK INTERVENTIONS

## 2024-02-22 NOTE — DIETITIAN INITIAL EVALUATION ADULT - PERTINENT MEDS FT
MEDICATIONS  (STANDING):  acetaminophen     Tablet .. 975 milliGRAM(s) Oral every 6 hours  aspirin enteric coated 81 milliGRAM(s) Oral daily  atenolol  Tablet 25 milliGRAM(s) Oral daily  atorvastatin 20 milliGRAM(s) Oral at bedtime  clopidogrel Tablet 75 milliGRAM(s) Oral daily  escitalopram 5 milliGRAM(s) Oral daily  finasteride 5 milliGRAM(s) Oral daily  heparin   Injectable 5000 Unit(s) SubCutaneous every 8 hours  levothyroxine 50 MICROGram(s) Oral daily    MEDICATIONS  (PRN):  oxyCODONE    IR 5 milliGRAM(s) Oral every 6 hours PRN Severe Pain (7 - 10)  oxyCODONE    IR 2.5 milliGRAM(s) Oral every 6 hours PRN Moderate Pain (4 - 6)

## 2024-02-22 NOTE — DISCHARGE NOTE PROVIDER - HOSPITAL COURSE
89 yr old male with hx of hypothyroidism, depression, HTN, CABG 1988, TAVR and pacemaker placement 7/23 and CAD stent 8/23, and PVD presented with non-healing right first toe wound with tingling and numbness right foot. He underwent common femoral endarterectomy on 2/21/24 with Dr. Arevalo. Patient tolerated operation well and there were no post-operative complications identified. His earl was removed and he was able to void spontaneously. Patient remained hemodynamically stable in the PACU and transferred to the surgical floor. Diet was restarted and advanced as tolerated. Pain control was transitioned from IV to PO pain meds. At this time, patient is currently ambulating, voiding, tolerating a regular diet. Pain well controlled on PO pain meds. Patient has been deemed stable for discharge home with follow up as an outpatient.    89 yr old male with hx of hypothyroidism, depression, HTN, CABG 1988, TAVR and pacemaker placement 7/23 and CAD stent 8/23, and PVD presented with non-healing right first toe wound with tingling and numbness right foot. He underwent common femoral endarterectomy on 2/21/24 with Dr. Arevalo. Patient tolerated operation well and there were no post-operative complications identified. His earl was removed and he was able to void spontaneously. Patient remained hemodynamically stable in the PACU and transferred to the surgical floor. Diet was restarted and advanced as tolerated. Pain control was transitioned from IV to PO pain meds.  At this time, patient is currently ambulating, voiding, tolerating a regular diet. Pain well controlled on PO pain meds. His prevena vac was removed prior to discharge. Patient has been deemed stable for discharge home with follow up as an outpatient.

## 2024-02-22 NOTE — DIETITIAN INITIAL EVALUATION ADULT - REASON FOR ADMISSION
Peripheral vascular disease    Patient is a 89y Male with PMH hypothyroidism, HTN, PAD, now status post R. femoral endarterectomy, R. leg angiogram 2/21/24.

## 2024-02-22 NOTE — DIETITIAN INITIAL EVALUATION ADULT - ORAL INTAKE PTA/DIET HISTORY
Patient reports no known food allergies or food intolerances. Patient denies any chewing or swallowing difficulty with regular solids or thin liquids. Nutrition supplementation at home includes Multivitamin and iron. Patient follows a low sodium dietary pattern. Patient endorses a good appetite at baseline, typically consumes three meals daily plus snacks. Denies any recent changes in appetite.    Patient reports usual body weight as 160lb, denies any recent weight changes  Per Ubaldo SORIA, noted the following weight history: 71.7kg (2/21), 71.6kg (1/12), 72.6kg (11/13), 72.6kg (10/18), 71.6kg (8/19)  Objective weight measurements suggests weight maintenance

## 2024-02-22 NOTE — DISCHARGE NOTE PROVIDER - PROVIDER TOKENS
PROVIDER:[TOKEN:[[id number]],FOLLOWUP:[1 week]] PROVIDER:[TOKEN:[[id number]],FOLLOWUP:[2 weeks]]

## 2024-02-22 NOTE — PATIENT PROFILE ADULT - FUNCTIONAL ASSESSMENT - DAILY ACTIVITY SECTION LABEL
Called and left a message to call the office back to schedule a new pt appointment with .  Referral in [address] [place] [address]
Scheduled 6/7/2021
.

## 2024-02-22 NOTE — DIETITIAN INITIAL EVALUATION ADULT - PERTINENT LABORATORY DATA
02-22    141  |  106  |  29<H>  ----------------------------<  128<H>  4.3   |  24  |  1.48<H>    Ca    7.6<L>      22 Feb 2024 06:00  Phos  4.0     02-22  Mg     2.00     02-22    TPro  x   /  Alb  2.0<L>  /  TBili  x   /  DBili  x   /  AST  x   /  ALT  x   /  AlkPhos  x   02-21

## 2024-02-22 NOTE — PHYSICAL THERAPY INITIAL EVALUATION ADULT - PERTINENT HX OF CURRENT PROBLEM, REHAB EVAL
Patient is 89 year old male, history of hypothyroidism, HTN, PAD, now s/p Rt. femoral endarterectomy, Rt. leg angiogram 2/21/24.

## 2024-02-22 NOTE — DISCHARGE NOTE PROVIDER - CARE PROVIDER_API CALL
Inge Arevalo  Vascular Surgery  [address] [place] [address]  Phone: [phone]  Fax: [phone]  Follow Up Time: 1 week   Inge Arevalo  Vascular Surgery  [address] [place] [address]  Phone: [phone]  Fax: [phone]  Follow Up Time: 2 weeks

## 2024-02-23 ENCOUNTER — TRANSCRIPTION ENCOUNTER (OUTPATIENT)
Age: 89
End: 2024-02-23

## 2024-02-23 VITALS
OXYGEN SATURATION: 99 % | RESPIRATION RATE: 18 BRPM | TEMPERATURE: 98 F | SYSTOLIC BLOOD PRESSURE: 126 MMHG | DIASTOLIC BLOOD PRESSURE: 61 MMHG | HEART RATE: 70 BPM

## 2024-02-23 LAB
ANION GAP SERPL CALC-SCNC: 7 MMOL/L — SIGNIFICANT CHANGE UP (ref 7–14)
BUN SERPL-MCNC: 29 MG/DL — HIGH (ref 7–23)
CALCIUM SERPL-MCNC: 7.7 MG/DL — LOW (ref 8.4–10.5)
CHLORIDE SERPL-SCNC: 105 MMOL/L — SIGNIFICANT CHANGE UP (ref 98–107)
CO2 SERPL-SCNC: 25 MMOL/L — SIGNIFICANT CHANGE UP (ref 22–31)
CREAT SERPL-MCNC: 1.59 MG/DL — HIGH (ref 0.5–1.3)
EGFR: 40 ML/MIN/1.73M2 — LOW
GLUCOSE SERPL-MCNC: 109 MG/DL — HIGH (ref 70–99)
HCT VFR BLD CALC: 25.8 % — LOW (ref 39–50)
HGB BLD-MCNC: 8.6 G/DL — LOW (ref 13–17)
MAGNESIUM SERPL-MCNC: 2 MG/DL — SIGNIFICANT CHANGE UP (ref 1.6–2.6)
MCHC RBC-ENTMCNC: 31.2 PG — SIGNIFICANT CHANGE UP (ref 27–34)
MCHC RBC-ENTMCNC: 33.3 GM/DL — SIGNIFICANT CHANGE UP (ref 32–36)
MCV RBC AUTO: 93.5 FL — SIGNIFICANT CHANGE UP (ref 80–100)
NRBC # BLD: 0 /100 WBCS — SIGNIFICANT CHANGE UP (ref 0–0)
NRBC # FLD: 0 K/UL — SIGNIFICANT CHANGE UP (ref 0–0)
PHOSPHATE SERPL-MCNC: 2.9 MG/DL — SIGNIFICANT CHANGE UP (ref 2.5–4.5)
PLATELET # BLD AUTO: 99 K/UL — LOW (ref 150–400)
POTASSIUM SERPL-MCNC: 4.2 MMOL/L — SIGNIFICANT CHANGE UP (ref 3.5–5.3)
POTASSIUM SERPL-SCNC: 4.2 MMOL/L — SIGNIFICANT CHANGE UP (ref 3.5–5.3)
RBC # BLD: 2.76 M/UL — LOW (ref 4.2–5.8)
RBC # FLD: 15.2 % — HIGH (ref 10.3–14.5)
SODIUM SERPL-SCNC: 137 MMOL/L — SIGNIFICANT CHANGE UP (ref 135–145)
WBC # BLD: 9.33 K/UL — SIGNIFICANT CHANGE UP (ref 3.8–10.5)
WBC # FLD AUTO: 9.33 K/UL — SIGNIFICANT CHANGE UP (ref 3.8–10.5)

## 2024-02-23 RX ORDER — LISINOPRIL/HYDROCHLOROTHIAZIDE 10-12.5 MG
1 TABLET ORAL
Refills: 0 | DISCHARGE

## 2024-02-23 RX ORDER — ACETAMINOPHEN 500 MG
3 TABLET ORAL
Qty: 0 | Refills: 0 | DISCHARGE
Start: 2024-02-23

## 2024-02-23 RX ORDER — ASPIRIN/CALCIUM CARB/MAGNESIUM 324 MG
1 TABLET ORAL
Qty: 0 | Refills: 0 | DISCHARGE
Start: 2024-02-23

## 2024-02-23 RX ADMIN — FINASTERIDE 5 MILLIGRAM(S): 5 TABLET, FILM COATED ORAL at 12:21

## 2024-02-23 RX ADMIN — Medication 975 MILLIGRAM(S): at 13:35

## 2024-02-23 RX ADMIN — Medication 975 MILLIGRAM(S): at 05:25

## 2024-02-23 RX ADMIN — Medication 81 MILLIGRAM(S): at 12:20

## 2024-02-23 RX ADMIN — ATENOLOL 25 MILLIGRAM(S): 25 TABLET ORAL at 05:26

## 2024-02-23 RX ADMIN — HEPARIN SODIUM 5000 UNIT(S): 5000 INJECTION INTRAVENOUS; SUBCUTANEOUS at 05:26

## 2024-02-23 RX ADMIN — Medication 975 MILLIGRAM(S): at 05:55

## 2024-02-23 RX ADMIN — CLOPIDOGREL BISULFATE 75 MILLIGRAM(S): 75 TABLET, FILM COATED ORAL at 12:20

## 2024-02-23 RX ADMIN — Medication 975 MILLIGRAM(S): at 12:21

## 2024-02-23 RX ADMIN — Medication 50 MICROGRAM(S): at 05:25

## 2024-02-23 NOTE — PROGRESS NOTE ADULT - ASSESSMENT
A/P: 89 yo M with PMHx of hypothyroidism, HTN, PAD, now s/p R. femoral endarterectomy, R. leg angiogram 2/21/24.      -Analgesia  -Prevena vacc  -Regular diet   -Home meds   -DVT ppx and DAPT resumed   -d/c [place]    Vascular surgery  [id number].
89 yo M with PMHx of hypothyroidism, HTN, PAD, now s/p R. femoral endarterectomy, R. leg angiogram 2/21/24.    PLAN:  -Pain control  -d/c Prevena vacc  -Regular diet   -Home meds   -c/w DVT ppx and DAPT    -Discharge home today    Vascular surgery  [id number].

## 2024-02-23 NOTE — DISCHARGE NOTE NURSING/CASE MANAGEMENT/SOCIAL WORK - PATIENT PORTAL LINK FT
You can access the FollowMyHealth Patient Portal offered by [facility] by registering at the following website: [email or web address]. By joining [facility]’s FollowMyHealth portal, you will also be able to view your health information using other applications (apps) compatible with our system.

## 2024-02-23 NOTE — PROGRESS NOTE ADULT - SUBJECTIVE AND OBJECTIVE BOX
CATARACTS, OU - CONTINUE TO MONITOR FOR NOW. SPECTACLE RX GIVEN, FOLLOW.
VASCULAR Surgery Daily Progress Note  =====================================================    SUBJECTIVE: Patient seen and examined at bedside on AM rounds.   --------------------------------------------------------------------------------------  VITAL SIGNS:  T(C): 36.3 (02-22-24 @ 06:00), Max: 36.6 (02-21-24 @ 20:00)  HR: 64 (02-22-24 @ 06:00) (60 - 64)  BP: 100/46 (02-22-24 @ 06:00) (88/38 - 145/50)  RR: 18 (02-22-24 @ 06:00) (12 - 20)  SpO2: 99% (02-22-24 @ 06:00) (97% - 100%)    Labs:      I&O's    02-21-24 @ 07:01  -  02-22-24 @ 07:00  --------------------------------------------------------  IN: 330 mL / OUT: 285 mL / NET: 45 mL      --------------------------------------------------------------------------------------    EXAM  Gen: NAD, well-developed  Resp: breathing easily, no stridor  Abd: soft, non-tender to palpation at all quadrants  Extremities: B/l AT/PT dopplerable, moving both extremities; left groin soft, non-tender to palpation with dressing c/d/i; Right groin soft, non-tender to palpation, prevena vacc suctioning appropriately    --------------------------------------------------------------------------------------    
  TEAM SURGERY PROGRESS NOTE    POST OPERATIVE DAY #: 2 s/p rt common femoral endarterectomy    SUBJECTIVE: Patient seen and examined at bedside on AM rounds, patient without complaints. Feeling well overall, denies pain.     Vital Signs Last 24 Hrs  T(C): 36.9 (23 Feb 2024 05:00), Max: 37.1 (22 Feb 2024 20:53)  T(F): 98.5 (23 Feb 2024 05:00), Max: 98.7 (22 Feb 2024 20:53)  HR: 76 (23 Feb 2024 05:00) (68 - 77)  BP: 132/54 (23 Feb 2024 05:00) (96/36 - 132/54)  BP(mean): --  RR: 18 (23 Feb 2024 05:00) (16 - 18)  SpO2: 98% (23 Feb 2024 05:00) (93% - 98%)    Parameters below as of 23 Feb 2024 05:00  Patient On (Oxygen Delivery Method): room air      I&O's Detail    22 Feb 2024 07:01  -  23 Feb 2024 07:00  --------------------------------------------------------  IN:    Oral Fluid: 1100 mL  Total IN: 1100 mL    OUT:    VAC (Vacuum Assisted Closure) System (mL): 0 mL    Voided (mL): 1670 mL  Total OUT: 1670 mL    Total NET: -570 mL        MEDICATIONS  (STANDING):  acetaminophen     Tablet .. 975 milliGRAM(s) Oral every 6 hours  aspirin enteric coated 81 milliGRAM(s) Oral daily  atenolol  Tablet 25 milliGRAM(s) Oral daily  atorvastatin 20 milliGRAM(s) Oral at bedtime  clopidogrel Tablet 75 milliGRAM(s) Oral daily  escitalopram 5 milliGRAM(s) Oral daily  finasteride 5 milliGRAM(s) Oral daily  heparin   Injectable 5000 Unit(s) SubCutaneous every 8 hours  levothyroxine 50 MICROGram(s) Oral daily    MEDICATIONS  (PRN):  oxyCODONE    IR 5 milliGRAM(s) Oral every 6 hours PRN Severe Pain (7 - 10)  oxyCODONE    IR 2.5 milliGRAM(s) Oral every 6 hours PRN Moderate Pain (4 - 6)      Physical Exam  Gen: NAD, well-developed  Resp: breathing easily, no stridor  Abd: soft, non-tender to palpation at all quadrants  Extremities: B/l AT/PT dopplerable, moving both extremities; left groin soft, non-tender to palpation with dressing c/d/i; Right groin soft, non-tender to palpation, prevena vacc suctioning appropriately    LABS:                        8.6    9.33  )-----------( 99       ( 23 Feb 2024 06:50 )             25.8     02-23    137  |  105  |  29<H>  ----------------------------<  109<H>  4.2   |  25  |  1.59<H>    Ca    7.7<L>      23 Feb 2024 06:50  Phos  2.9     02-23  Mg     2.00     02-23    TPro  x   /  Alb  2.0<L>  /  TBili  x   /  DBili  x   /  AST  x   /  ALT  x   /  AlkPhos  x   02-21      Urinalysis Basic - ( 23 Feb 2024 06:50 )    Color: x / Appearance: x / SG: x / pH: x  Gluc: 109 mg/dL / Ketone: x  / Bili: x / Urobili: x   Blood: x / Protein: x / Nitrite: x   Leuk Esterase: x / RBC: x / WBC x   Sq Epi: x / Non Sq Epi: x / Bacteria: x          Patient is a 89y Male

## 2024-02-23 NOTE — DISCHARGE NOTE NURSING/CASE MANAGEMENT/SOCIAL WORK - NSDCPEFALRISK_GEN_ALL_CORE
For information on Fall & Injury Prevention, visit: [email or web address] OR  [email or web address] OR  https://www.cdc.gov/steadi/patient.html

## 2024-02-26 ENCOUNTER — INPATIENT (INPATIENT)
Facility: HOSPITAL | Age: 89
LOS: 1 days | Discharge: HOME | End: 2024-02-28
Attending: SURGERY | Admitting: SURGERY
Payer: MEDICARE

## 2024-02-26 ENCOUNTER — RESULT REVIEW (OUTPATIENT)
Age: 89
End: 2024-02-26

## 2024-02-26 VITALS
SYSTOLIC BLOOD PRESSURE: 122 MMHG | OXYGEN SATURATION: 100 % | HEIGHT: 66 IN | TEMPERATURE: 98 F | HEART RATE: 67 BPM | RESPIRATION RATE: 17 BRPM | DIASTOLIC BLOOD PRESSURE: 56 MMHG

## 2024-02-26 DIAGNOSIS — R19.09 OTHER INTRA-ABDOMINAL AND PELVIC SWELLING, MASS AND LUMP: ICD-10-CM

## 2024-02-26 DIAGNOSIS — Z95.1 PRESENCE OF AORTOCORONARY BYPASS GRAFT: Chronic | ICD-10-CM

## 2024-02-26 DIAGNOSIS — Z98.890 OTHER SPECIFIED POSTPROCEDURAL STATES: Chronic | ICD-10-CM

## 2024-02-26 DIAGNOSIS — Z95.2 PRESENCE OF PROSTHETIC HEART VALVE: Chronic | ICD-10-CM

## 2024-02-26 LAB
ALBUMIN SERPL ELPH-MCNC: 2.6 G/DL — LOW (ref 3.3–5)
ALP SERPL-CCNC: 74 U/L — SIGNIFICANT CHANGE UP (ref 40–120)
ALT FLD-CCNC: 91 U/L — HIGH (ref 4–41)
ANION GAP SERPL CALC-SCNC: 10 MMOL/L — SIGNIFICANT CHANGE UP (ref 7–14)
APTT BLD: 27.5 SEC — SIGNIFICANT CHANGE UP (ref 24.5–35.6)
AST SERPL-CCNC: 74 U/L — HIGH (ref 4–40)
BASOPHILS # BLD AUTO: 0.01 K/UL — SIGNIFICANT CHANGE UP (ref 0–0.2)
BASOPHILS NFR BLD AUTO: 0.1 % — SIGNIFICANT CHANGE UP (ref 0–2)
BILIRUB SERPL-MCNC: 0.4 MG/DL — SIGNIFICANT CHANGE UP (ref 0.2–1.2)
BUN SERPL-MCNC: 41 MG/DL — HIGH (ref 7–23)
CALCIUM SERPL-MCNC: 8.2 MG/DL — LOW (ref 8.4–10.5)
CHLORIDE SERPL-SCNC: 104 MMOL/L — SIGNIFICANT CHANGE UP (ref 98–107)
CO2 SERPL-SCNC: 22 MMOL/L — SIGNIFICANT CHANGE UP (ref 22–31)
CREAT SERPL-MCNC: 1.68 MG/DL — HIGH (ref 0.5–1.3)
EGFR: 38 ML/MIN/1.73M2 — LOW
EOSINOPHIL # BLD AUTO: 0.03 K/UL — SIGNIFICANT CHANGE UP (ref 0–0.5)
EOSINOPHIL NFR BLD AUTO: 0.4 % — SIGNIFICANT CHANGE UP (ref 0–6)
GLUCOSE SERPL-MCNC: 122 MG/DL — HIGH (ref 70–99)
HCT VFR BLD CALC: 26.6 % — LOW (ref 39–50)
HGB BLD-MCNC: 8.7 G/DL — LOW (ref 13–17)
IANC: 5.9 K/UL — SIGNIFICANT CHANGE UP (ref 1.8–7.4)
IMM GRANULOCYTES NFR BLD AUTO: 0.6 % — SIGNIFICANT CHANGE UP (ref 0–0.9)
INR BLD: 1.02 RATIO — SIGNIFICANT CHANGE UP (ref 0.85–1.18)
LYMPHOCYTES # BLD AUTO: 0.78 K/UL — LOW (ref 1–3.3)
LYMPHOCYTES # BLD AUTO: 10.8 % — LOW (ref 13–44)
MCHC RBC-ENTMCNC: 30.9 PG — SIGNIFICANT CHANGE UP (ref 27–34)
MCHC RBC-ENTMCNC: 32.7 GM/DL — SIGNIFICANT CHANGE UP (ref 32–36)
MCV RBC AUTO: 94.3 FL — SIGNIFICANT CHANGE UP (ref 80–100)
MONOCYTES # BLD AUTO: 0.48 K/UL — SIGNIFICANT CHANGE UP (ref 0–0.9)
MONOCYTES NFR BLD AUTO: 6.6 % — SIGNIFICANT CHANGE UP (ref 2–14)
NEUTROPHILS # BLD AUTO: 5.9 K/UL — SIGNIFICANT CHANGE UP (ref 1.8–7.4)
NEUTROPHILS NFR BLD AUTO: 81.5 % — HIGH (ref 43–77)
NRBC # BLD: 0 /100 WBCS — SIGNIFICANT CHANGE UP (ref 0–0)
NRBC # FLD: 0 K/UL — SIGNIFICANT CHANGE UP (ref 0–0)
PLATELET # BLD AUTO: 166 K/UL — SIGNIFICANT CHANGE UP (ref 150–400)
POTASSIUM SERPL-MCNC: 4.6 MMOL/L — SIGNIFICANT CHANGE UP (ref 3.5–5.3)
POTASSIUM SERPL-SCNC: 4.6 MMOL/L — SIGNIFICANT CHANGE UP (ref 3.5–5.3)
PROT SERPL-MCNC: 5.1 G/DL — LOW (ref 6–8.3)
PROTHROM AB SERPL-ACNC: 11.4 SEC — SIGNIFICANT CHANGE UP (ref 9.5–13)
RBC # BLD: 2.82 M/UL — LOW (ref 4.2–5.8)
RBC # FLD: 14.5 % — SIGNIFICANT CHANGE UP (ref 10.3–14.5)
SODIUM SERPL-SCNC: 136 MMOL/L — SIGNIFICANT CHANGE UP (ref 135–145)
WBC # BLD: 7.24 K/UL — SIGNIFICANT CHANGE UP (ref 3.8–10.5)
WBC # FLD AUTO: 7.24 K/UL — SIGNIFICANT CHANGE UP (ref 3.8–10.5)

## 2024-02-26 PROCEDURE — 99285 EMERGENCY DEPT VISIT HI MDM: CPT | Mod: GC

## 2024-02-26 RX ORDER — ESCITALOPRAM OXALATE 10 MG/1
5 TABLET, FILM COATED ORAL DAILY
Refills: 0 | Status: DISCONTINUED | OUTPATIENT
Start: 2024-02-26 | End: 2024-02-28

## 2024-02-26 RX ORDER — LEVOTHYROXINE SODIUM 125 MCG
50 TABLET ORAL EVERY 24 HOURS
Refills: 0 | Status: DISCONTINUED | OUTPATIENT
Start: 2024-02-26 | End: 2024-02-28

## 2024-02-26 RX ORDER — SODIUM CHLORIDE 9 MG/ML
1000 INJECTION INTRAMUSCULAR; INTRAVENOUS; SUBCUTANEOUS ONCE
Refills: 0 | Status: COMPLETED | OUTPATIENT
Start: 2024-02-26 | End: 2024-02-26

## 2024-02-26 RX ORDER — ATENOLOL 25 MG/1
25 TABLET ORAL EVERY 24 HOURS
Refills: 0 | Status: DISCONTINUED | OUTPATIENT
Start: 2024-02-26 | End: 2024-02-28

## 2024-02-26 RX ORDER — CEPHALEXIN 500 MG
500 CAPSULE ORAL
Refills: 0 | Status: DISCONTINUED | OUTPATIENT
Start: 2024-02-26 | End: 2024-02-26

## 2024-02-26 RX ORDER — ASPIRIN/CALCIUM CARB/MAGNESIUM 324 MG
81 TABLET ORAL DAILY
Refills: 0 | Status: DISCONTINUED | OUTPATIENT
Start: 2024-02-26 | End: 2024-02-28

## 2024-02-26 RX ORDER — HEPARIN SODIUM 5000 [USP'U]/ML
5000 INJECTION INTRAVENOUS; SUBCUTANEOUS EVERY 8 HOURS
Refills: 0 | Status: DISCONTINUED | OUTPATIENT
Start: 2024-02-26 | End: 2024-02-28

## 2024-02-26 RX ORDER — CLOPIDOGREL BISULFATE 75 MG/1
75 TABLET, FILM COATED ORAL EVERY 24 HOURS
Refills: 0 | Status: DISCONTINUED | OUTPATIENT
Start: 2024-02-26 | End: 2024-02-28

## 2024-02-26 RX ORDER — FINASTERIDE 5 MG/1
5 TABLET, FILM COATED ORAL DAILY
Refills: 0 | Status: DISCONTINUED | OUTPATIENT
Start: 2024-02-26 | End: 2024-02-28

## 2024-02-26 RX ORDER — CEPHALEXIN 500 MG
500 CAPSULE ORAL EVERY 12 HOURS
Refills: 0 | Status: DISCONTINUED | OUTPATIENT
Start: 2024-02-26 | End: 2024-02-28

## 2024-02-26 RX ORDER — ATORVASTATIN CALCIUM 80 MG/1
20 TABLET, FILM COATED ORAL AT BEDTIME
Refills: 0 | Status: DISCONTINUED | OUTPATIENT
Start: 2024-02-26 | End: 2024-02-28

## 2024-02-26 RX ADMIN — Medication 500 MILLIGRAM(S): at 19:35

## 2024-02-26 RX ADMIN — ATORVASTATIN CALCIUM 20 MILLIGRAM(S): 80 TABLET, FILM COATED ORAL at 23:11

## 2024-02-26 RX ADMIN — SODIUM CHLORIDE 1000 MILLILITER(S): 9 INJECTION INTRAMUSCULAR; INTRAVENOUS; SUBCUTANEOUS at 14:10

## 2024-02-26 RX ADMIN — HEPARIN SODIUM 5000 UNIT(S): 5000 INJECTION INTRAVENOUS; SUBCUTANEOUS at 23:12

## 2024-02-26 NOTE — H&P ADULT - HISTORY OF PRESENT ILLNESS
89M Hx hypothyroid, HLD, HTN, CKD, CAD s/p CABG 1988, PCI 2023 on DAPT, AS s/p TAVR 2023, CLTI RLE (R 1st toe wound) s/p Dx RLE angio, s/p RLE CFA Endarterectomy 2/21/24, pw R groin bulge. Reports onset of R groin bulge yesterday. Ambulating well. Denies paresthesias, worsening RLE pain, fevers, chills, SOB, CP, drainage from R groin incision.     HDS, AF in ED. Labs w Hgb 8.7 from 8.6 on 2/23. WBC wnl. COAGS wnl. Crt 1.68 from 1.59. Art Duplex performed, read pending.

## 2024-02-26 NOTE — ED ADULT NURSE REASSESSMENT NOTE - NS ED NURSE REASSESS COMMENT FT1
report given from nurse , patient laying in semi fowlers position on the stretcher. patient alert and oriented times four. patient denies shortness of breath, chest pain, nausea, vomiting, chill, fever. Patient normal sinus on the monitor. Respirations equal and adequate. Patients IV patent, no signs of infiltration. Safety measures in place, call bell within reach. Patient stable upon leaving the area. report given to ARNULFO HERNANDEZ essu 3

## 2024-02-26 NOTE — ED ADULT TRIAGE NOTE - CHIEF COMPLAINT QUOTE
patient states he was sent in by his MD for a CT. patient states he had surgery on 2/21, and has been noting swelling to the groin area. patient unsure what surgery he had, states "I think I had stents placed in my leg" past medical history of CAD, PVD, aortic stenosis

## 2024-02-26 NOTE — ED PROVIDER NOTE - PHYSICAL EXAMINATION
Dereck Macedo DO (PGY2)   Physical Exam:    Gen: NAD, AOx3  Head: NCAT  HEENT: EOMI, PEERLA  Lung: CTAB, no respiratory distress, no wheezes/rhonchi/rales B/L  CV: RRR, no murmurs, rubs or gallops  Abd: soft, NT, ND, no guarding, no rigidity, no rebound tenderness, no CVA tenderness   MSK: Right groin swelling at the region of the common femoral artery, staples over incision point which are clean dry and intact  Neuro: No focal sensory or motor deficits. Sensation intact to light touch all extremities.  Skin: Warm, well perfused, no rash, no leg swelling

## 2024-02-26 NOTE — ED ADULT NURSE NOTE - OBJECTIVE STATEMENT
No Patient presented to ED with a chief complaint of right groin swelling after procedure on 2/21/24. Patient states that he has a hx of PAD and had stent placements and now has a new onset of painless swelling to right groin. Patient able to move toes and states that his sensation is intact. Denies fever, chills, n/v/d, SOB, dizziness.    Patient is A/O x 4, NAD, skin intact, PERRLA, speech clear, neurologically intact with no deficits, strength b/l upper and lower extremities 5/5, sensation intact b/l upper and lower extremities, (+) staples to left groin with green ecchymosis and swelling  rate and rhythm regular S1 and S2 heard with no murmurs radial and pedal pulses present, capillary refill <3 , lungs clear b/l lobes throughout with no adventitious sounds or accessory muscle use, even and unlabored, abdomen soft and non-tender, bowel sounds heard x 4 quadrant, no edema noted. Safety maintained, bed in lowest position, call bell within reach, plan of care reviewed with patient , addressed all questions and concern, will continue to monitor patient.

## 2024-02-26 NOTE — ED PROVIDER NOTE - PROGRESS NOTE DETAILS
Dereck Macedo DO (PGY2)  Vascular surgery paged Dereck Macedo DO (PGY2)  Vascular surgery recommending arterial duplex of right lower extremity as well as basic labs.  Pending final recommendations

## 2024-02-26 NOTE — H&P ADULT - ASSESSMENT
89M Hx hypothyroid, HLD, HTN, CKD, CAD s/p CABG 1988, PCI 2023 on DAPT, AS s/p TAVR 2023, CLTI RLE (R 1st toe wound) s/p Dx RLE angio, s/p RLE CFA Endarterectomy 2/21/24, pw R groin bulge.     Exam cf R groin hematoma vs possible pseudoaneurysm of R CFA (although bulge is non pulsatile). Low concern for distal LE ischemia given dopplerable pedal pulses.     Plan:   -Admit to C Team, Dr. Arevalo  -Art Duplex RLE performed, read pending   -NPO for now, ADAT pending Art Duplex read   -Keflex  -Synthroid  -DAPT/SQH  -Strict Bed Rest for now     Discussed with Fellow on behalf of Attending Surgeon Dr. Mickey Bean MD PGY3  C Team, [id number]  89M Hx hypothyroid, HLD, HTN, CKD, CAD s/p CABG 1988, PCI 2023 on DAPT, AS s/p TAVR 2023, CLTI RLE (R 1st toe wound) s/p Dx RLE angio, s/p RLE CFA Endarterectomy 2/21/24, pw R groin bulge.     Exam cf R groin hematoma vs possible pseudoaneurysm of R CFA (although bulge is non pulsatile). Low concern for distal LE ischemia given dopplerable pedal pulses.     Plan:   -Admit to C Team, Dr. Arevalo  -Art Duplex RLE performed, read pending   -NPO for now, ADAT pending Art Duplex read   -Keflex  -Synthroid  -DAPT  -Hold Chem VTE ppx i/s/o ?Hematoma   -Strict Bed Rest for now     Discussed with Fellow on behalf of Attending Surgeon Dr. Mickey Bean MD PGY3  C Team, [id number]  89M Hx hypothyroid, HLD, HTN, CKD, CAD s/p CABG 1988, PCI 2023 on DAPT, AS s/p TAVR 2023, CLTI RLE (R 1st toe wound) s/p Dx RLE angio, s/p RLE CFA Endarterectomy 2/21/24, pw R groin bulge.     Exam cf R groin hematoma vs possible pseudoaneurysm of R CFA (although bulge is non pulsatile). Low concern for distal LE ischemia given dopplerable pedal pulses.     Plan:   -Admit to C Team, Dr. Arevalo  -Art Duplex RLE performed, read pending   -NPO for now, ADAT pending Art Duplex read   -Keflex  -Synthroid  -DAPT/SQH  -Strict Bed Rest for now     Discussed with Fellow on behalf of Attending Surgeon Dr. Mickey Bean MD PGY3  C Team, [id number]

## 2024-02-26 NOTE — ED PROVIDER NOTE - CLINICAL SUMMARY MEDICAL DECISION MAKING FREE TEXT BOX
89-year-old male history of hypothyroidism, BPH, hypertension, depression, aortic stenosis status post TAVR and pacemaker placement, CAD status post CABG, peripheral artery disease with recent right common femoral endarterectomy on February 21 presenting with groin swelling. Patient noticed isolated groin swelling for 1 day.  Denies any pain to the area, redness.  Denies any drainage from femoral entrance point.  Denies any fevers, chills, chest pain, shortness of breath, abdominal pain, nausea vomiting diarrhea.  States he spoke with his vascular surgeon who told him to come in for evaluation.  Vital signs stable, afebrile, not hypoxic. Plan for basic labs, arterial duplex of right lower extremity, vascular surgery recommendations.

## 2024-02-26 NOTE — CONSULT NOTE ADULT - SUBJECTIVE AND OBJECTIVE BOX
VASCULAR SURGERY CONSULT NOTE  --------------------------------------------------------------------------------------------        HPI: 89M Hx hypothyroid, HLD, HTN, CAD s/p CABG 1988, PCI 2023 on DAPT, AS s/p TAVR 2023, CLTI RLE (R 1st toe wound) s/p Dx RLE angio, s/p RLE CFA Endarterectomy 2/21/24, pw R groin bulge. Vascular surgery consulted for evaluation.     HDS, AF in ED. Labs/Imaging pending.     Seen and examined. Reports onset of R groin bulge yesterday. Ambulating well. Denies paresthesias, worsening RLE pain, fevers, chills, SOB, CP, drainage from R groin incision.       PAST MEDICAL & SURGICAL HISTORY:  History of BPH      Hypothyroid      PVD (peripheral vascular disease)      CAD (coronary artery disease)      Aortic stenosis      Anxiety and depression      S/P TAVR (transcatheter aortic valve replacement)      S/P CABG (coronary artery bypass graft)      H/O coronary angiogram        FAMILY HISTORY:    [] Family history not pertinent as reviewed with the patient and family      ALLERGIES: No Known Allergies    CURRENT MEDICATIONS  MEDICATIONS (STANDING): sodium chloride 0.9% Bolus 1000 milliLiter(s) IV Bolus once    MEDICATIONS (PRN):  --------------------------------------------------------------------------------------------    Vitals:   T(C): 36.4 (02-26-24 @ 11:27), Max: 36.4 (02-26-24 @ 11:27)  HR: 67 (02-26-24 @ 11:27) (67 - 67)  BP: 122/56 (02-26-24 @ 11:27) (122/56 - 122/56)  RR: 17 (02-26-24 @ 11:27) (17 - 17)  SpO2: 100% (02-26-24 @ 11:27) (100% - 100%)  CAPILLARY BLOOD GLUCOSE        CAPILLARY BLOOD GLUCOSE          Height (cm): 167.6 (02-26 @ 11:27)  Weight (kg): 71.7 (02-21 @ 09:57)  BMI (kg/m2): 25.5 (02-26 @ 11:27)  BSA (m2): 1.81 (02-26 @ 11:27)    PHYSICAL EXAM:   General: Alert, NAD  Neuro: A+Ox3  HEENT: NC/AT, no asymmetry, no scleral icterus  Neck: Soft, supple  Cardio: RRR  Resp: Airway patent, unlabored breathing  Thorax: No chest wall tenderness  GI/Abd: Soft, NT/ND, no rebound/guarding, no masses palpated  Vascular: All 4 extremities warm, B/l radial pulses palpable, b/l pop dopplerable, LLE femoral pulse palpable,  b/l AT/PT dopplerable, R groin bulge ~4cm, non pulsatile, no fluid expressed   Skin: RLE 1st toe wound w dry gangrene, w minimal surrounding erythema   Musculoskeletal: All 4 extremities moving spontaneously, no limitations, sensorimotor intact b/l LE   --------------------------------------------------------------------------------------------    LABS                --------------------------------------------------------------------------------------------    MICROBIOLOGY      --------------------------------------------------------------------------------------------    IMAGING   VASCULAR SURGERY CONSULT NOTE  --------------------------------------------------------------------------------------------        HPI: 89M Hx hypothyroid, HLD, HTN, CKD, CAD s/p CABG 1988, PCI 2023 on DAPT, AS s/p TAVR 2023, CLTI RLE (R 1st toe wound) s/p Dx RLE angio, s/p RLE CFA Endarterectomy 2/21/24, pw R groin bulge. Vascular surgery consulted for evaluation.     HDS, AF in ED. Labs/Imaging pending.     Seen and examined. Reports onset of R groin bulge yesterday. Ambulating well. Denies paresthesias, worsening RLE pain, fevers, chills, SOB, CP, drainage from R groin incision.       PAST MEDICAL & SURGICAL HISTORY:  History of BPH      Hypothyroid      PVD (peripheral vascular disease)      CAD (coronary artery disease)      Aortic stenosis      Anxiety and depression      S/P TAVR (transcatheter aortic valve replacement)      S/P CABG (coronary artery bypass graft)      H/O coronary angiogram        FAMILY HISTORY:    [] Family history not pertinent as reviewed with the patient and family      ALLERGIES: No Known Allergies    CURRENT MEDICATIONS  MEDICATIONS (STANDING): sodium chloride 0.9% Bolus 1000 milliLiter(s) IV Bolus once    MEDICATIONS (PRN):  --------------------------------------------------------------------------------------------    Vitals:   T(C): 36.4 (02-26-24 @ 11:27), Max: 36.4 (02-26-24 @ 11:27)  HR: 67 (02-26-24 @ 11:27) (67 - 67)  BP: 122/56 (02-26-24 @ 11:27) (122/56 - 122/56)  RR: 17 (02-26-24 @ 11:27) (17 - 17)  SpO2: 100% (02-26-24 @ 11:27) (100% - 100%)  CAPILLARY BLOOD GLUCOSE        CAPILLARY BLOOD GLUCOSE          Height (cm): 167.6 (02-26 @ 11:27)  Weight (kg): 71.7 (02-21 @ 09:57)  BMI (kg/m2): 25.5 (02-26 @ 11:27)  BSA (m2): 1.81 (02-26 @ 11:27)    PHYSICAL EXAM:   General: Alert, NAD  Neuro: A+Ox3  HEENT: NC/AT, no asymmetry, no scleral icterus  Neck: Soft, supple  Cardio: RRR  Resp: Airway patent, unlabored breathing  Thorax: No chest wall tenderness  GI/Abd: Soft, NT/ND, no rebound/guarding, no masses palpated  Vascular: All 4 extremities warm, B/l radial pulses palpable, b/l pop dopplerable, LLE femoral pulse palpable,  b/l AT/PT dopplerable, R groin bulge ~4cm, non pulsatile, no fluid expressed   Skin: RLE 1st toe wound w dry gangrene, w minimal surrounding erythema   Musculoskeletal: All 4 extremities moving spontaneously, no limitations, sensorimotor intact b/l LE   --------------------------------------------------------------------------------------------    LABS                --------------------------------------------------------------------------------------------    MICROBIOLOGY      --------------------------------------------------------------------------------------------    IMAGING   VASCULAR SURGERY CONSULT NOTE  --------------------------------------------------------------------------------------------  HPI: 89M Hx hypothyroid, HLD, HTN, CKD, CAD s/p CABG 1988, PCI 2023 on DAPT, AS s/p TAVR 2023, CLTI RLE (R 1st toe wound) s/p Dx RLE angio, s/p RLE CFA Endarterectomy 2/21/24, pw R groin bulge. Vascular surgery consulted for evaluation.     HDS, AF in ED. Labs/Imaging pending.     Seen and examined. Reports onset of R groin bulge yesterday. Ambulating well. Denies paresthesias, worsening RLE pain, fevers, chills, SOB, CP, drainage from R groin incision.     PAST MEDICAL & SURGICAL HISTORY:  History of BPH      Hypothyroid      PVD (peripheral vascular disease)      CAD (coronary artery disease)      Aortic stenosis      Anxiety and depression      S/P TAVR (transcatheter aortic valve replacement)      S/P CABG (coronary artery bypass graft)      H/O coronary angiogram        FAMILY HISTORY:    [] Family history not pertinent as reviewed with the patient and family      ALLERGIES: No Known Allergies    CURRENT MEDICATIONS  MEDICATIONS (STANDING): sodium chloride 0.9% Bolus 1000 milliLiter(s) IV Bolus once    MEDICATIONS (PRN):  --------------------------------------------------------------------------------------------    Vitals:   T(C): 36.4 (02-26-24 @ 11:27), Max: 36.4 (02-26-24 @ 11:27)  HR: 67 (02-26-24 @ 11:27) (67 - 67)  BP: 122/56 (02-26-24 @ 11:27) (122/56 - 122/56)  RR: 17 (02-26-24 @ 11:27) (17 - 17)  SpO2: 100% (02-26-24 @ 11:27) (100% - 100%)  CAPILLARY BLOOD GLUCOSE        CAPILLARY BLOOD GLUCOSE          Height (cm): 167.6 (02-26 @ 11:27)  Weight (kg): 71.7 (02-21 @ 09:57)  BMI (kg/m2): 25.5 (02-26 @ 11:27)  BSA (m2): 1.81 (02-26 @ 11:27)    PHYSICAL EXAM:   General: Alert, NAD  Neuro: A+Ox3  HEENT: NC/AT, no asymmetry, no scleral icterus  Neck: Soft, supple  Cardio: RRR  Resp: Airway patent, unlabored breathing  Thorax: No chest wall tenderness  GI/Abd: Soft, NT/ND, no rebound/guarding, no masses palpated  Vascular: All 4 extremities warm, B/l radial pulses palpable, b/l pop dopplerable, LLE femoral pulse palpable,  b/l AT/PT dopplerable, R groin bulge ~4cm, non pulsatile, no fluid expressed   Skin: RLE 1st toe wound w dry gangrene, w minimal surrounding erythema   Musculoskeletal: All 4 extremities moving spontaneously, no limitations, sensorimotor intact b/l LE   --------------------------------------------------------------------------------------------    LABS                --------------------------------------------------------------------------------------------    MICROBIOLOGY      --------------------------------------------------------------------------------------------    IMAGING

## 2024-02-26 NOTE — ED PROVIDER NOTE - OBJECTIVE STATEMENT
89-year-old male history of hypothyroidism, BPH, hypertension, depression, aortic stenosis status post TAVR and pacemaker placement, CAD status post CABG, peripheral artery disease with recent right common femoral endarterectomy on February 21 presenting with groin swelling. 89-year-old male history of hypothyroidism, BPH, hypertension, depression, aortic stenosis status post TAVR and pacemaker placement, CAD status post CABG, peripheral artery disease with recent right common femoral endarterectomy on February 21 presenting with groin swelling. Patient noticed isolated groin swelling for 1 day.  Denies any pain to the area, redness.  Denies any drainage from femoral entrance point.  Denies any fevers, chills, chest pain, shortness of breath, abdominal pain, nausea vomiting diarrhea.  States he spoke with his vascular surgeon who told him to come in for evaluation.

## 2024-02-26 NOTE — CONSULT NOTE ADULT - ASSESSMENT
89M Hx hypothyroid, HLD, HTN, CAD s/p CABG 1988, PCI 2023 on DAPT, AS s/p TAVR 2023, CLTI RLE (R 1st toe wound) s/p Dx RLE angio, s/p RLE CFA Endarterectomy 2/21/24, pw R groin bulge. Vascular surgery consulted for evaluation.     Exam cf R groin hematoma vs possible pseudoaneurysm of R CFA (although bulge is non pulsatile). Low concern for distal LE ischemia given dopplerable pedal pulses.     Plan:  -No acute surgical intervention  -Arterial Duplex RLE   -CBC/BMP +Mg PHos/T/S/Coags  -CTA Abd aorta w Run off pending Crt eval   -Keep NPO/IVF  -Vascular to follow up imaging     Discussed with Fellow on behalf of Attending Surgeon Dr. Mickey Bean MD PGY3  C Team, [id number]  89M Hx hypothyroid, HLD, HTN, CKD, CAD s/p CABG 1988, PCI 2023 on DAPT, AS s/p TAVR 2023, CLTI RLE (R 1st toe wound) s/p Dx RLE angio, s/p RLE CFA Endarterectomy 2/21/24, pw R groin bulge. Vascular surgery consulted for evaluation.     Exam cf R groin hematoma vs possible pseudoaneurysm of R CFA (although bulge is non pulsatile). Low concern for distal LE ischemia given dopplerable pedal pulses.     Plan:  -No acute surgical intervention  -Arterial Duplex RLE   -CBC/BMP +Mg PHos/T/S/Coags  -CTA Abd aorta w Run off pending Crt eval   -Keep NPO/IVF  -Vascular to follow up imaging     Discussed with Fellow on behalf of Attending Surgeon Dr. Mickey Baen MD PGY3  C Team, [id number]  89M Hx hypothyroid, HLD, HTN, CKD, CAD s/p CABG 1988, PCI 2023 on DAPT, AS s/p TAVR 2023, CLTI RLE (R 1st toe wound) s/p Dx RLE angio, s/p RLE CFA Endarterectomy 2/21/24, pw R groin bulge. Vascular surgery consulted for evaluation.     Exam cf R groin hematoma vs possible pseudoaneurysm of R CFA (although bulge is non pulsatile). Low concern for distal LE ischemia given dopplerable pedal pulses.     Plan:  -No acute surgical intervention  -Arterial Duplex RLE   -CBC/BMP +Mg PHos/T/S/Coags  -Hold off on CT imaging given CKD   -Keep NPO/IVF  -Vascular to follow up imaging     Discussed with Fellow on behalf of Attending Surgeon Dr. Mickey Bean MD PGY3  C Team, [id number]  89M Hx hypothyroid, HLD, HTN, CKD, CAD s/p CABG 1988, PCI 2023 on DAPT, AS s/p TAVR 2023, CLTI RLE (R 1st toe wound) s/p Dx RLE angio, s/p RLE CFA Endarterectomy 2/21/24, pw R groin bulge. Vascular surgery consulted for evaluation.     Exam cf R groin hematoma vs possible pseudoaneurysm of R CFA (although bulge is non pulsatile). Low concern for distal LE ischemia given dopplerable pedal pulses.     Plan:  -No acute surgical intervention  -Arterial Duplex RLE   -CBC/BMP +Mg PHos/T/S/Coags  -Hold off on CT imaging given CKD   -Keep NPO/IVF  -Strict Bedrest for now   -Vascular to follow up imaging     Discussed with Fellow on behalf of Attending Surgeon Dr. Mickey Bean MD PGY3  C Team, [phone]

## 2024-02-26 NOTE — H&P ADULT - NSHPPHYSICALEXAM_GEN_ALL_CORE
General: Alert, NAD  Neuro: A+Ox3  HEENT: NC/AT, no asymmetry, no scleral icterus  Neck: Soft, supple  Cardio: RRR  Resp: Airway patent, unlabored breathing  Thorax: No chest wall tenderness  GI/Abd: Soft, NT/ND, no rebound/guarding, no masses palpated  Vascular: All 4 extremities warm, B/l radial pulses palpable, b/l pop dopplerable, LLE femoral pulse palpable,  b/l AT/PT dopplerable, R groin bulge ~4cm, non pulsatile, no fluid expressed   Skin: RLE 1st toe wound w dry gangrene, w minimal surrounding erythema   Musculoskeletal: All 4 extremities moving spontaneously, no limitations, sensorimotor intact b/l LE

## 2024-02-26 NOTE — H&P ADULT - NSHPLABSRESULTS_GEN_ALL_CORE
8.7    7.24  )-----------( 166      ( 26 Feb 2024 14:20 )             26.6   02-26    136  |  104  |  41<H>  ----------------------------<  122<H>  4.6   |  22  |  1.68<H>    Ca    8.2<L>      26 Feb 2024 14:20    TPro  5.1<L>  /  Alb  2.6<L>  /  TBili  0.4  /  DBili  x   /  AST  74<H>  /  ALT  91<H>  /  AlkPhos  74  02-26
Bilateral pneumonia

## 2024-02-26 NOTE — ED PROVIDER NOTE - ATTENDING CONTRIBUTION TO CARE
89-year-old male history of hypothyroidism, BPH, hypertension, depression, aortic stenosis status post TAVR and pacemaker placement, CAD status post CABG, peripheral artery disease with recent right common femoral endarterectomy on February 21 presenting with groin swelling. Patient noticed isolated groin swelling for 1 day.  Denies any pain to the area, redness.  Denies any drainage from femoral entrance point.  Denies any fevers, chills, chest pain, shortness of breath, abdominal pain, nausea vomiting diarrhea.  States he spoke with his vascular surgeon who told him to come in for evaluation.

## 2024-02-26 NOTE — ED ADULT NURSE NOTE - NS ED NURSE LEVEL OF CONSCIOUSNESS MENTAL STATUS
76 male h/o htn, dm, chol, prostate ca, ckd, here with chest pain    chest pain  acs ruled out with trop neg x2  cards consulted  tele  echo  stress test    cardiac pause  hold av blockers  tele  echo  cards f/u    dm  iss  monitor fs    chol  cont statin    htn  cont losartn  hold av blockers  tele    prostate ca  currently receiving RT  outpt onc f/u     ckd  creat at baseline  monitor  avoid nephrotoxins    dvt ppx      Advanced care planning was discussed with patient and family.  Advanced care planning forms were reviewed and discussed as appropriate.  Differential diagnosis and plan of care discussed with patient after the evaluation.   Pain assessed and judicious use of narcotics when appropriate was discussed.  Importance of Fall prevention discussed.  Counseling on Smoking and Alcohol cessation was offered when appropriate.  Counseling on Diet, exercise, and medication compliance was done.       Approx 60 minutes spent.
Awake/Alert

## 2024-02-26 NOTE — ED ADULT NURSE NOTE - NSFALLHARMRISKINTERV_ED_ALL_ED

## 2024-02-27 ENCOUNTER — TRANSCRIPTION ENCOUNTER (OUTPATIENT)
Age: 89
End: 2024-02-27

## 2024-02-27 LAB
ANION GAP SERPL CALC-SCNC: 8 MMOL/L — SIGNIFICANT CHANGE UP (ref 7–14)
BLD GP AB SCN SERPL QL: NEGATIVE — SIGNIFICANT CHANGE UP
BUN SERPL-MCNC: 29 MG/DL — HIGH (ref 7–23)
CALCIUM SERPL-MCNC: 8.3 MG/DL — LOW (ref 8.4–10.5)
CHLORIDE SERPL-SCNC: 106 MMOL/L — SIGNIFICANT CHANGE UP (ref 98–107)
CO2 SERPL-SCNC: 26 MMOL/L — SIGNIFICANT CHANGE UP (ref 22–31)
CREAT SERPL-MCNC: 1.4 MG/DL — HIGH (ref 0.5–1.3)
EGFR: 47 ML/MIN/1.73M2 — LOW
GLUCOSE SERPL-MCNC: 101 MG/DL — HIGH (ref 70–99)
HCT VFR BLD CALC: 28.1 % — LOW (ref 39–50)
HGB BLD-MCNC: 9.1 G/DL — LOW (ref 13–17)
MAGNESIUM SERPL-MCNC: 2.4 MG/DL — SIGNIFICANT CHANGE UP (ref 1.6–2.6)
MCHC RBC-ENTMCNC: 31.1 PG — SIGNIFICANT CHANGE UP (ref 27–34)
MCHC RBC-ENTMCNC: 32.4 GM/DL — SIGNIFICANT CHANGE UP (ref 32–36)
MCV RBC AUTO: 95.9 FL — SIGNIFICANT CHANGE UP (ref 80–100)
NRBC # BLD: 0 /100 WBCS — SIGNIFICANT CHANGE UP (ref 0–0)
NRBC # FLD: 0 K/UL — SIGNIFICANT CHANGE UP (ref 0–0)
PHOSPHATE SERPL-MCNC: 2.9 MG/DL — SIGNIFICANT CHANGE UP (ref 2.5–4.5)
PLATELET # BLD AUTO: 160 K/UL — SIGNIFICANT CHANGE UP (ref 150–400)
POTASSIUM SERPL-MCNC: 4.4 MMOL/L — SIGNIFICANT CHANGE UP (ref 3.5–5.3)
POTASSIUM SERPL-SCNC: 4.4 MMOL/L — SIGNIFICANT CHANGE UP (ref 3.5–5.3)
RBC # BLD: 2.93 M/UL — LOW (ref 4.2–5.8)
RBC # FLD: 14.5 % — SIGNIFICANT CHANGE UP (ref 10.3–14.5)
RH IG SCN BLD-IMP: POSITIVE — SIGNIFICANT CHANGE UP
SODIUM SERPL-SCNC: 140 MMOL/L — SIGNIFICANT CHANGE UP (ref 135–145)
WBC # BLD: 5.41 K/UL — SIGNIFICANT CHANGE UP (ref 3.8–10.5)
WBC # FLD AUTO: 5.41 K/UL — SIGNIFICANT CHANGE UP (ref 3.8–10.5)

## 2024-02-27 RX ORDER — BENZOCAINE AND MENTHOL 5; 1 G/100ML; G/100ML
1 LIQUID ORAL
Refills: 0 | Status: DISCONTINUED | OUTPATIENT
Start: 2024-02-27 | End: 2024-02-28

## 2024-02-27 RX ORDER — LANOLIN ALCOHOL/MO/W.PET/CERES
3 CREAM (GRAM) TOPICAL AT BEDTIME
Refills: 0 | Status: DISCONTINUED | OUTPATIENT
Start: 2024-02-27 | End: 2024-02-28

## 2024-02-27 RX ADMIN — ESCITALOPRAM OXALATE 5 MILLIGRAM(S): 10 TABLET, FILM COATED ORAL at 13:27

## 2024-02-27 RX ADMIN — HEPARIN SODIUM 5000 UNIT(S): 5000 INJECTION INTRAVENOUS; SUBCUTANEOUS at 21:20

## 2024-02-27 RX ADMIN — HEPARIN SODIUM 5000 UNIT(S): 5000 INJECTION INTRAVENOUS; SUBCUTANEOUS at 13:28

## 2024-02-27 RX ADMIN — FINASTERIDE 5 MILLIGRAM(S): 5 TABLET, FILM COATED ORAL at 13:27

## 2024-02-27 RX ADMIN — ATENOLOL 25 MILLIGRAM(S): 25 TABLET ORAL at 06:04

## 2024-02-27 RX ADMIN — Medication 500 MILLIGRAM(S): at 06:02

## 2024-02-27 RX ADMIN — Medication 3 MILLIGRAM(S): at 21:36

## 2024-02-27 RX ADMIN — Medication 81 MILLIGRAM(S): at 13:27

## 2024-02-27 RX ADMIN — HEPARIN SODIUM 5000 UNIT(S): 5000 INJECTION INTRAVENOUS; SUBCUTANEOUS at 06:02

## 2024-02-27 RX ADMIN — Medication 50 MICROGRAM(S): at 06:02

## 2024-02-27 RX ADMIN — Medication 500 MILLIGRAM(S): at 18:36

## 2024-02-27 RX ADMIN — ATORVASTATIN CALCIUM 20 MILLIGRAM(S): 80 TABLET, FILM COATED ORAL at 21:19

## 2024-02-27 RX ADMIN — CLOPIDOGREL BISULFATE 75 MILLIGRAM(S): 75 TABLET, FILM COATED ORAL at 06:02

## 2024-02-27 RX ADMIN — BENZOCAINE AND MENTHOL 1 LOZENGE: 5; 1 LIQUID ORAL at 06:01

## 2024-02-27 NOTE — PATIENT PROFILE ADULT - FALL HARM RISK - HARM RISK INTERVENTIONS

## 2024-02-27 NOTE — DISCHARGE NOTE PROVIDER - HOSPITAL COURSE
89y Male with h/o hypothyroid, HLD, HTN, CKD, CAD s/p CABG 1988, PCI 2023 on DAPT, AS s/p TAVR 2023, CLTI RLE (R 1st toe wound) s/p Dx RLE angio, s/p RLE CFA Endarterectomy 2/21/24 was admitted for right groin swelling. Ultrasound of the RLE showed [id number] right groin hematoma. Pt was monitored overnight. Pt had dopplerable pedal pulses and H/H stable, low concern for distal LE ischemia. Pt was placed on keflex of antibiotics prophylaxis     On the day of discharge, the patient tolerated regular diet. The patient was hemodynamically stable and placed on home medications. The patient was told to follow up with Dr. AVTAR Arevalo in 1 weeks and had no other issues.   89y Male with h/o hypothyroid, HLD, HTN, CKD, CAD s/p CABG 1988, PCI 2023 on DAPT, AS s/p TAVR 2023, CLTI RLE (R 1st toe wound) s/p Dx RLE angio, s/p RLE CFA Endarterectomy 2/21/24 was admitted for right groin swelling. Ultrasound of the RLE showed [id number] right groin hematoma. Pt was monitored overnight. Pt had dopplerable pedal pulses and H/H stable, low concern for distal LE ischemia. Pt was placed on keflex of antibiotics prophylaxis.   At this time, patient is currently ambulating, voiding, tolerating a regular diet. Pain well controlled on PO pain meds. His H/H and hematoma remained stable. Patient has been deemed stable for discharge home with follow up as an outpatient. All questions and concerns were addressed and patient was notified to follow up outpatient on 3/4/24.

## 2024-02-27 NOTE — DISCHARGE NOTE PROVIDER - PROVIDER RX CONTACT NUMBER
Patient has persistent depression which is moderate and is currently controlled. Will Continue anti-depressant medications. We will consult psychiatry at this time. Patient does not display psychosis at this time. Continue to monitor closely and adjust plan of care as needed.       [phone]

## 2024-02-27 NOTE — DISCHARGE NOTE PROVIDER - PROVIDER TOKENS
PROVIDER:[TOKEN:[[id number]],FOLLOWUP:[1 week]] PROVIDER:[TOKEN:[[id number]],SCHEDULEDAPPT:[03/04/2024]]

## 2024-02-27 NOTE — DISCHARGE NOTE PROVIDER - NSDCFUADDINST_GEN_ALL_CORE_FT
WOUND CARE:  Keep wound clean and dry. Do not scrub or rub incisions. Do not use lotion or powder on incisions. Monitor hematoma. Please call the office if hematoma grows.   BATHING: Please do not submerge wound underwater. You may shower and/or sponge bathe.  ACTIVITY: No heavy lifting or straining. Otherwise, you may return to your usual level of physical activity. If you are taking narcotic pain medication (such as Percocet) DO NOT drive a car, operate machinery or make important decisions.  DIET: Return to your usual diet.  NOTIFY YOUR SURGEON IF: You have any bleeding that does not stop, any pus draining from your wound(s), any fever (over 100.4 F) or chills, persistent nausea/vomiting, persistent diarrhea, or if your pain is not controlled on your discharge pain medications.  FOLLOW-UP: Please follow up with your primary care physician in one week regarding your hospitalization.   Please follow up with Dr. Arevalo outpatient in 1 week.

## 2024-02-27 NOTE — DISCHARGE NOTE PROVIDER - NSDCFUSCHEDAPPT_GEN_ALL_CORE_FT
Cj Carlos Physician [facility]  [address]  Scheduled Appointment: 03/07/2024    Cj Carlos  [facility]  [address]  Scheduled Appointment: 04/15/2024

## 2024-02-27 NOTE — PROGRESS NOTE ADULT - ATTENDING COMMENTS
S/p right FEA, angiogram. P/w groin hematoma. Duplex shows no PSA or active bleeding. H/H stable.   -Monitor hematoma with increased activity/ambulation  -Continue antibiotics  -D/c home in next couple of days

## 2024-02-27 NOTE — DISCHARGE NOTE PROVIDER - NSDCMRMEDTOKEN_GEN_ALL_CORE_FT
aspirin 81 mg oral delayed release tablet: 1 tab(s) orally once a day  aspirin 81 mg po daily:   atenolol 25 mg oral tablet: 1 tab(s) orally once a day am  atorvastatin 20 mg oral tablet: 1 tab(s) orally once a day  clopidogrel 75 mg oral tablet: 1 tab(s) orally once a day  Lexapro 5 mg oral tablet: 1 tab(s) orally once a day  lisinopril-hydrochlorothiazide 20 mg-12.5 mg oral tablet: 1 tab(s) orally once a day  Physical therapy: Outpatient physical therapy as needed  Proscar 5 mg oral tablet: 1 tab(s) orally once a day  Synthroid 50 mcg (0.05 mg) oral tablet: 1 tab(s) orally once a day   aspirin 81 mg oral delayed release tablet: 1 tab(s) orally once a day  atenolol 25 mg oral tablet: 1 tab(s) orally once a day am  atorvastatin 20 mg oral tablet: 1 tab(s) orally once a day  cephalexin 500 mg oral capsule: 1 cap(s) orally every 12 hours  clopidogrel 75 mg oral tablet: 1 tab(s) orally once a day  Lexapro 5 mg oral tablet: 1 tab(s) orally once a day  Physical therapy: Outpatient physical therapy as needed  Proscar 5 mg oral tablet: 1 tab(s) orally once a day  Synthroid 50 mcg (0.05 mg) oral tablet: 1 tab(s) orally once a day

## 2024-02-27 NOTE — DISCHARGE NOTE PROVIDER - CARE PROVIDER_API CALL
Inge Arevalo  Vascular Surgery  [address] [place] [address]  Phone: [phone]  Fax: [phone]  Follow Up Time: 1 week   Inge Arevalo  Vascular Surgery  [address] [place] [address]  Phone: [phone]  Fax: [phone]  Scheduled Appointment: 03/04/2024

## 2024-02-28 ENCOUNTER — TRANSCRIPTION ENCOUNTER (OUTPATIENT)
Age: 89
End: 2024-02-28

## 2024-02-28 VITALS
SYSTOLIC BLOOD PRESSURE: 135 MMHG | DIASTOLIC BLOOD PRESSURE: 63 MMHG | RESPIRATION RATE: 18 BRPM | TEMPERATURE: 98 F | HEART RATE: 65 BPM | OXYGEN SATURATION: 98 %

## 2024-02-28 LAB
ANION GAP SERPL CALC-SCNC: 9 MMOL/L — SIGNIFICANT CHANGE UP (ref 7–14)
BUN SERPL-MCNC: 31 MG/DL — HIGH (ref 7–23)
CALCIUM SERPL-MCNC: 8 MG/DL — LOW (ref 8.4–10.5)
CHLORIDE SERPL-SCNC: 106 MMOL/L — SIGNIFICANT CHANGE UP (ref 98–107)
CO2 SERPL-SCNC: 25 MMOL/L — SIGNIFICANT CHANGE UP (ref 22–31)
CREAT SERPL-MCNC: 1.47 MG/DL — HIGH (ref 0.5–1.3)
EGFR: 44 ML/MIN/1.73M2 — LOW
GLUCOSE SERPL-MCNC: 101 MG/DL — HIGH (ref 70–99)
HCT VFR BLD CALC: 26.2 % — LOW (ref 39–50)
HGB BLD-MCNC: 8.4 G/DL — LOW (ref 13–17)
MAGNESIUM SERPL-MCNC: 2.3 MG/DL — SIGNIFICANT CHANGE UP (ref 1.6–2.6)
MCHC RBC-ENTMCNC: 31.1 PG — SIGNIFICANT CHANGE UP (ref 27–34)
MCHC RBC-ENTMCNC: 32.1 GM/DL — SIGNIFICANT CHANGE UP (ref 32–36)
MCV RBC AUTO: 97 FL — SIGNIFICANT CHANGE UP (ref 80–100)
NRBC # BLD: 0 /100 WBCS — SIGNIFICANT CHANGE UP (ref 0–0)
NRBC # FLD: 0 K/UL — SIGNIFICANT CHANGE UP (ref 0–0)
PHOSPHATE SERPL-MCNC: 3.2 MG/DL — SIGNIFICANT CHANGE UP (ref 2.5–4.5)
PLATELET # BLD AUTO: 163 K/UL — SIGNIFICANT CHANGE UP (ref 150–400)
POTASSIUM SERPL-MCNC: 4.4 MMOL/L — SIGNIFICANT CHANGE UP (ref 3.5–5.3)
POTASSIUM SERPL-SCNC: 4.4 MMOL/L — SIGNIFICANT CHANGE UP (ref 3.5–5.3)
RBC # BLD: 2.7 M/UL — LOW (ref 4.2–5.8)
RBC # FLD: 14.6 % — HIGH (ref 10.3–14.5)
SODIUM SERPL-SCNC: 140 MMOL/L — SIGNIFICANT CHANGE UP (ref 135–145)
WBC # BLD: 5.84 K/UL — SIGNIFICANT CHANGE UP (ref 3.8–10.5)
WBC # FLD AUTO: 5.84 K/UL — SIGNIFICANT CHANGE UP (ref 3.8–10.5)

## 2024-02-28 RX ORDER — CEPHALEXIN 500 MG
1 CAPSULE ORAL
Qty: 14 | Refills: 0
Start: 2024-02-28 | End: 2024-03-05

## 2024-02-28 RX ORDER — LISINOPRIL/HYDROCHLOROTHIAZIDE 10-12.5 MG
1 TABLET ORAL
Refills: 0 | DISCHARGE

## 2024-02-28 RX ADMIN — Medication 500 MILLIGRAM(S): at 17:05

## 2024-02-28 RX ADMIN — HEPARIN SODIUM 5000 UNIT(S): 5000 INJECTION INTRAVENOUS; SUBCUTANEOUS at 15:50

## 2024-02-28 RX ADMIN — CLOPIDOGREL BISULFATE 75 MILLIGRAM(S): 75 TABLET, FILM COATED ORAL at 05:19

## 2024-02-28 RX ADMIN — ATENOLOL 25 MILLIGRAM(S): 25 TABLET ORAL at 05:19

## 2024-02-28 RX ADMIN — Medication 50 MICROGRAM(S): at 05:19

## 2024-02-28 RX ADMIN — ESCITALOPRAM OXALATE 5 MILLIGRAM(S): 10 TABLET, FILM COATED ORAL at 12:45

## 2024-02-28 RX ADMIN — HEPARIN SODIUM 5000 UNIT(S): 5000 INJECTION INTRAVENOUS; SUBCUTANEOUS at 05:19

## 2024-02-28 RX ADMIN — Medication 500 MILLIGRAM(S): at 05:19

## 2024-02-28 RX ADMIN — FINASTERIDE 5 MILLIGRAM(S): 5 TABLET, FILM COATED ORAL at 12:44

## 2024-02-28 RX ADMIN — Medication 81 MILLIGRAM(S): at 12:44

## 2024-02-28 NOTE — DISCHARGE NOTE NURSING/CASE MANAGEMENT/SOCIAL WORK - PATIENT PORTAL LINK FT
You can access the FollowMyHealth Patient Portal offered by [facility] by registering at the following website: [email or web address]. By joining [facility]’s FollowMyHealth portal, you will also be able to view your health information using other applications (apps) compatible with our system.

## 2024-02-28 NOTE — PROGRESS NOTE ADULT - SUBJECTIVE AND OBJECTIVE BOX
VASCULAR Surgery Daily Progress Note  =====================================================    SUBJECTIVE: Patient seen and examined at bedside on AM rounds.   --------------------------------------------------------------------------------------  VITAL SIGNS:  T(C): 36.6 (02-27-24 @ 05:50), Max: 36.8 (02-26-24 @ 22:00)  HR: 65 (02-27-24 @ 05:50) (65 - 67)  BP: 133/73 (02-27-24 @ 05:50) (106/53 - 149/69)  RR: 18 (02-27-24 @ 05:50) (16 - 18)  SpO2: 100% (02-27-24 @ 05:50) (98% - 100%)    Labs:      I&O's    02-26-24 @ 07:01  -  02-27-24 @ 07:00  --------------------------------------------------------  IN: 240 mL / OUT: 550 mL / NET: -310 mL      --------------------------------------------------------------------------------------    EXAM  General: Alert, NAD  HEENT: NC/AT, no asymmetry, no scleral icterus  Neck: Soft, supple  Cardio: RRR  Resp: unlabored breathing  GI/Abd: Soft, NT/ND, no rebound/guarding, no masses palpated  Vascular: All 4 extremities warm, B/l radial pulses palpable, b/l pop dopplerable, LLE femoral pulse palpable,  b/l AT/PT dopplerable, R groin bulge ~4cm, non pulsatile, no fluid expressed   Skin: RLE 1st toe wound w dry gangrene, w minimal surrounding erythema   Musculoskeletal: All 4 extremities moving spontaneously, no limitations, sensorimotor intact b/l LE  --------------------------------------------------------------------------------------    
TEAM [ [facility] ] Surgery Daily Progress Note  =====================================================    SUBJECTIVE: Patient seen and examined at bedside on AM rounds. Patient reports that they're feeling well. Groin unchanged from day prior.    PMH:  PAST MEDICAL & SURGICAL HISTORY:  History of BPH      Hypothyroid      PVD (peripheral vascular disease)      CAD (coronary artery disease)      Aortic stenosis      Anxiety and depression      S/P TAVR (transcatheter aortic valve replacement)      S/P CABG (coronary artery bypass graft)      H/O coronary angiogram          ALLERGIES:  No Known Allergies      --------------------------------------------------------------------------------------    MEDICATIONS:    Neurologic Medications  escitalopram 5 milliGRAM(s) Oral daily  melatonin 3 milliGRAM(s) Oral at bedtime PRN Sleep    Respiratory Medications    Cardiovascular Medications  atenolol  Tablet 25 milliGRAM(s) Oral every 24 hours    Gastrointestinal Medications    Genitourinary Medications    Hematologic/Oncologic Medications  aspirin enteric coated 81 milliGRAM(s) Oral daily  clopidogrel Tablet 75 milliGRAM(s) Oral every 24 hours  heparin   Injectable 5000 Unit(s) SubCutaneous every 8 hours    Antimicrobial/Immunologic Medications  cephalexin 500 milliGRAM(s) Oral every 12 hours    Endocrine/Metabolic Medications  atorvastatin 20 milliGRAM(s) Oral at bedtime  finasteride 5 milliGRAM(s) Oral daily  levothyroxine 50 MICROGram(s) Oral every 24 hours    Topical/Other Medications  benzocaine/menthol Lozenge 1 Lozenge Oral two times a day PRN dry mouth    --------------------------------------------------------------------------------------    VITAL SIGNS:  Vital Signs Last 24 Hrs  T(C): 36.7 (28 Feb 2024 05:19), Max: 36.9 (27 Feb 2024 21:00)  T(F): 98 (28 Feb 2024 05:19), Max: 98.4 (27 Feb 2024 21:00)  HR: 61 (28 Feb 2024 05:19) (60 - 64)  BP: 129/55 (28 Feb 2024 05:19) (114/58 - 135/63)  BP(mean): --  RR: 17 (28 Feb 2024 05:19) (17 - 18)  SpO2: 98% (28 Feb 2024 05:19) (95% - 98%)    Parameters below as of 28 Feb 2024 05:19  Patient On (Oxygen Delivery Method): room air      --------------------------------------------------------------------------------------    EXAM    General: Alert, NAD  HEENT: NC/AT, no asymmetry, no scleral icterus  Neck: Soft, supple  Cardio: RRR  Resp: unlabored breathing  GI/Abd: Soft, NT/ND, no rebound/guarding, no masses palpated  Vascular: All 4 extremities warm, B/l radial pulses palpable, b/l pop dopplerable, LLE femoral pulse palpable,  b/l AT/PT dopplerable, R groin bulge ~4cm, non pulsatile, no fluid expressed   Skin: RLE 1st toe wound w dry gangrene, w minimal surrounding erythema   Musculoskeletal: All 4 extremities moving spontaneously, no limitations, sensorimotor intact b/l LE    --------------------------------------------------------------------------------------    LABS                        8.4    5.84  )-----------( 163      ( 28 Feb 2024 06:20 )             26.2   02-27    140  |  106  |  29<H>  ----------------------------<  101<H>  4.4   |  26  |  1.40<H>    Ca    8.3<L>      27 Feb 2024 06:28  Phos  2.9     02-27  Mg     2.40     02-27    TPro  5.1<L>  /  Alb  2.6<L>  /  TBili  0.4  /  DBili  x   /  AST  74<H>  /  ALT  91<H>  /  AlkPhos  74  02-26      --------------------------------------------------------------------------------------    INS AND OUTS:  I&O's Detail    27 Feb 2024 07:01  -  28 Feb 2024 07:00  --------------------------------------------------------  IN:    Oral Fluid: 400 mL  Total IN: 400 mL    OUT:    Voided (mL): 200 mL  Total OUT: 200 mL    Total NET: 200 mL        --------------------------------------------------------------------------------------

## 2024-02-28 NOTE — DISCHARGE NOTE NURSING/CASE MANAGEMENT/SOCIAL WORK - NSDCPEFALRISK_GEN_ALL_CORE
For information on Fall & Injury Prevention, visit: [email or web address] OR  [email or web address] OR  https://www.cdc.gov/steadi/patient.html

## 2024-02-28 NOTE — PROGRESS NOTE ADULT - ASSESSMENT
89M Hx hypothyroid, HLD, HTN, CKD, CAD s/p CABG 1988, PCI 2023 on DAPT, AS s/p TAVR 2023, CLTI RLE (R 1st toe wound) s/p Dx RLE angio, s/p RLE CFA Endarterectomy 2/21/24, pw R groin hematoma, hematoma stable, low concern for distal LE ischemia given dopplerable pedal pulses.     Plan:   - regular diet   -Keflex  -Synthroid  -DAPT/SQH  - dispo: home         C Team, [id number]   
89M Hx hypothyroid, HLD, HTN, CKD, CAD s/p CABG 1988, PCI 2023 on DAPT, AS s/p TAVR 2023, CLTI RLE (R 1st toe wound) s/p Dx RLE angio, s/p RLE CFA Endarterectomy 2/21/24, pw R groin hematoma. Low concern for distal LE ischemia given dopplerable pedal pulses.     Plan:   - regular diet   -Keflex  -Synthroid  -DAPT/SQH  - dispo: home         C Team, [id number] 
0 = independent

## 2024-02-29 LAB — SURGICAL PATHOLOGY STUDY: SIGNIFICANT CHANGE UP

## 2024-03-04 ENCOUNTER — APPOINTMENT (OUTPATIENT)
Dept: VASCULAR SURGERY | Facility: CLINIC | Age: 89
End: 2024-03-04
Payer: MEDICARE

## 2024-03-04 VITALS
DIASTOLIC BLOOD PRESSURE: 62 MMHG | SYSTOLIC BLOOD PRESSURE: 152 MMHG | BODY MASS INDEX: 24.66 KG/M2 | WEIGHT: 159 LBS | HEIGHT: 67.5 IN | HEART RATE: 70 BPM | TEMPERATURE: 98 F

## 2024-03-04 PROCEDURE — 99024 POSTOP FOLLOW-UP VISIT: CPT

## 2024-03-05 NOTE — HISTORY OF PRESENT ILLNESS
[FreeTextEntry1] : I have just had the pleasure of seeing Mr. Lex Solorio in consultation from Dr. Jerry for right lower extremity arterial insufficiency.   Mr. Solorio is a 89-year-old gentleman who presents with a three month history of non-healing right first toe wound. He was previously placed on antibiotics. He is followed by Dr. Jerry from podiatry. He has not had any non-invasive testing. He denies any symptoms of lower extremity claudication. He reports rest pain in the distal right foot. He has not had any prior vascular surgical intervention on his lower extremities. He underwent PCI/TAVR (bovine) at [facility] in July 2023. He denies any history of CVA or TIA. He denies any history of CRI or DM although per bloodwork from last week Creatinine is 1.47.  His medical history is otherwise significant for remote CABG x 4 with left GSV harvest, HTN, HLD, hypothyroidism, and bilateral macular degeneration and retinal tears.  Medications: Atenolol, Lipitor, Plavix, Aspirin, Lexapro, Lisinopril, Proscar and Synthroid.  Allergies: NDKA  Social history: Non-smoker  FH: [place]   [de-identified] : 2/12/24: Mr. Solorio presents s/p RLE angiography for PAD with tissue loss. He is accompanied by his wife and two children. Angiography on 2/1/24 showed right common femoral artery stenosis, distal SFA stenosis and tibial disease. No intervention was performed due to the right CFA lesion. He denies any access site complications. He is taking DAPT and statin.   3/4/24: Mr. Solorio is now s/p right femoral endarterectomy, performed on 2/21/24. Angiography showed small vessel disease in the foot. He was discharged home on POD2, but returned with right groin induration consistent with hematoma vs seroma. He was placed on antibiotics and monitored. Hematoma remained stable and he was discharged home on Keflex. He returns today for groin wound check. He reports some serous drainage from the incision.

## 2024-03-05 NOTE — PHYSICAL EXAM
[de-identified] : On physical examination the patient is in no acute distress and neurologically intact. The lungs are clear to auscultation and the heart has a regular rate and rhythm. Abdomen is benign. Bilateral femoral pulses are palpable. 4-5cm area of induration at superior aspect of right groin incision. Pedal pulses are non-palpable. Rigth distal first toe 1cm ulcer.

## 2024-03-05 NOTE — ASSESSMENT
[FreeTextEntry1] : In summary, Mr. Solorio presents s/p right femoral endarterectomy. I removed a staple at the superior aspect of the incision and drained serous fluid. Incision is now much less indurated. Wound was packed with 1/4" iodoform packing. -Patient will return on 3/6/24 for evaluation by NP Krystyna (some kayy at inferior aspect of incision may be removed if wound stable) -VNS ordered for continued packing at home -Keflex for one additional week ordered -Continue DAPT, statin -Foot care per podiatry

## 2024-03-06 ENCOUNTER — APPOINTMENT (OUTPATIENT)
Dept: VASCULAR SURGERY | Facility: CLINIC | Age: 89
End: 2024-03-06

## 2024-03-18 ENCOUNTER — APPOINTMENT (OUTPATIENT)
Dept: OPHTHALMOLOGY | Facility: CLINIC | Age: 89
End: 2024-03-18
Payer: MEDICARE

## 2024-03-18 ENCOUNTER — NON-APPOINTMENT (OUTPATIENT)
Age: 89
End: 2024-03-18

## 2024-03-18 PROCEDURE — 92134 CPTRZ OPH DX IMG PST SGM RTA: CPT

## 2024-03-18 PROCEDURE — 67028 INJECTION EYE DRUG: CPT | Mod: RT

## 2024-03-19 ENCOUNTER — OUTPATIENT (OUTPATIENT)
Dept: OUTPATIENT SERVICES | Facility: HOSPITAL | Age: 89
LOS: 1 days | End: 2024-03-19
Payer: MEDICARE

## 2024-03-19 ENCOUNTER — APPOINTMENT (OUTPATIENT)
Dept: VASCULAR SURGERY | Facility: CLINIC | Age: 89
End: 2024-03-19
Payer: MEDICARE

## 2024-03-19 VITALS
BODY MASS INDEX: 24.8 KG/M2 | SYSTOLIC BLOOD PRESSURE: 151 MMHG | HEIGHT: 67 IN | DIASTOLIC BLOOD PRESSURE: 63 MMHG | WEIGHT: 158 LBS | HEART RATE: 66 BPM

## 2024-03-19 DIAGNOSIS — Z95.2 PRESENCE OF PROSTHETIC HEART VALVE: Chronic | ICD-10-CM

## 2024-03-19 DIAGNOSIS — L97.512 NON-PRESSURE CHRONIC ULCER OF OTHER PART OF RIGHT FOOT WITH FAT LAYER EXPOSED: ICD-10-CM

## 2024-03-19 DIAGNOSIS — Z95.1 PRESENCE OF AORTOCORONARY BYPASS GRAFT: Chronic | ICD-10-CM

## 2024-03-19 PROCEDURE — 99214 OFFICE O/P EST MOD 30 MIN: CPT

## 2024-03-19 PROCEDURE — 99204 OFFICE O/P NEW MOD 45 MIN: CPT

## 2024-03-19 PROCEDURE — 73630 X-RAY EXAM OF FOOT: CPT | Mod: 26,RT

## 2024-03-19 PROCEDURE — 73630 X-RAY EXAM OF FOOT: CPT

## 2024-03-19 PROCEDURE — 93926 LOWER EXTREMITY STUDY: CPT

## 2024-03-19 RX ORDER — CEPHALEXIN 500 MG/1
500 TABLET ORAL
Qty: 28 | Refills: 0 | Status: DISCONTINUED | COMMUNITY
Start: 2024-03-04 | End: 2024-03-19

## 2024-03-20 PROBLEM — L97.512 SKIN ULCER OF RIGHT GREAT TOE WITH FAT LAYER EXPOSED: Status: ACTIVE | Noted: 2024-01-29

## 2024-03-20 NOTE — REASON FOR VISIT
[Consultation] : a consultation visit [Family Member] : family member [FreeTextEntry1] : R great toe ulcer w/exposed distal phalynx

## 2024-03-20 NOTE — PROCEDURE
[FreeTextEntry1] : RLE arterial duplex performed to evaluate for LE perfusion demonstrates a widely patent R FEA w/strong in-flow, in-line flow to the foot via three tibial arteries.

## 2024-03-20 NOTE — HISTORY OF PRESENT ILLNESS
[FreeTextEntry1] : 89yoM CAD s/p CABG x 4 w/L GSV harvest, HTN, HLD, hypothyroidism, bilateral macular degeneration w/retinal tears, recently s/p R FEA w/Dr. Fadia Arevalo for a non-healing R great toe ulcer w/exposure of his phalynx, presents for second opinion for treatment of his chronic wound.  Pt states that his wound has been open for 8mos w/o any significant improvement until he underwent FEA w/[facility].  Wound is now dry and well-circumscribed, but toe is swollen and discolored, though non-tender and not draining fluid.  Pt was seen by Dr. Bobby today who immediately sent pt to us for vascular evaluation.  PMHx: CAD s/p CABG x 4 w/L GSV harvest and PCI/TAVR (bovine) at [facility] in July 2023, HTN, HLD, hypothyroidism, bilateral macular degeneration w/retinal tears. Medications: Atenolol, Lipitor, Plavix, Aspirin, Lexapro, Lisinopril, Proscar and Synthroid. Allergies: NDKA Social history: Non-smoker

## 2024-03-20 NOTE — ADDENDUM
[FreeTextEntry1] : This note was written by Shamar Esquivel, acting as a scribe for Dr. Vic Ramirez.  I, Dr. Vic Ramirez, have read and attest that all the information, medical decision-making, and discharge instructions within are true and accurate.  I, Dr. Vic Ramirez, personally performed the evaluation and management (E/M) services for this new patient.  That E/M includes conducting the initial examination, assessing all conditions, and establishing the plan of care.  Today, my ACP, Shamar Esquivel, was here to observe my evaluation and management services for this patient to be followed going forward.

## 2024-03-20 NOTE — PHYSICAL EXAM
[Normal Breath Sounds] : Normal breath sounds [JVD] : no jugular venous distention  [Respiratory Effort] : normal respiratory effort [Normal Heart Sounds] : normal heart sounds [Normal Rate and Rhythm] : normal rate and rhythm [2+] : left 2+ [1+] : left 1+ [Ankle Swelling (On Exam)] : present [Varicose Veins Of Lower Extremities] : not present [Ankle Swelling On The Right] : mild [] : not present [Purpura] : no purpura  [Skin Ulcer] : ulcer [Petechiae] : no petechiae [Alert] : alert [Skin Induration] : no induration [Calm] : calm [de-identified] : NC/AT, anicteric [de-identified] : Healthy, NAD [FreeTextEntry1] : biphasic signal at b/l DP/PT [de-identified] : FROM throughout, strength 5/5x4 [de-identified] : Dry R great toe ulcer at the tip w/exposed distal phalynx, congested/edematous toe, no streaking or erythema in the foot

## 2024-03-20 NOTE — ASSESSMENT
[FreeTextEntry1] : 89yoM CAD s/p CABG x 4 w/L GSV harvest, HTN, HLD, hypothyroidism, bilateral macular degeneration w/retinal tears, recently s/p R FEA w/Dr. Fadia Arevalo for a non-healing R great toe ulcer w/exposure of his phalynx, presents for second opinion for treatment of his chronic wound.  Pt states that his wound has been open for 8mos w/o any significant improvement until he underwent FEA w/[facility].  Wound is now dry and well-circumscribed, but toe is swollen and discolored, though non-tender and not draining fluid.  Pt was seen by Dr. Bobby today who immediately sent pt to us for vascular evaluation.  Dry R great toe ulcer at the tip w/exposed distal phalanx, congested/edematous toe, no streaking or erythema in the foot.  RLE arterial duplex performed to evaluate for LE perfusion demonstrates a widely patent R FEA w/strong in-flow, in-line flow to the foot via three tibial arteries.  No active infection appreciated in the toe/foot at this time but recommend pt be evaluated by podiatry for possible distal toe amputation.  Provided pt w/contact info for Dr. Hollingsworth (podiatry) for wound care and possible amp, recommend the pt RTO if the surgical site does not heal or the toe deteriorates.

## 2024-03-25 ENCOUNTER — APPOINTMENT (OUTPATIENT)
Dept: VASCULAR SURGERY | Facility: CLINIC | Age: 89
End: 2024-03-25
Payer: MEDICARE

## 2024-03-25 VITALS
DIASTOLIC BLOOD PRESSURE: 66 MMHG | TEMPERATURE: 97.4 F | HEIGHT: 67.5 IN | BODY MASS INDEX: 24.51 KG/M2 | SYSTOLIC BLOOD PRESSURE: 163 MMHG | WEIGHT: 158 LBS | HEART RATE: 56 BPM

## 2024-03-25 PROCEDURE — 99024 POSTOP FOLLOW-UP VISIT: CPT

## 2024-03-26 NOTE — PHYSICAL EXAM
[de-identified] : On physical examination the patient is in no acute distress and neurologically intact. The lungs are clear to auscultation and the heart has a regular rate and rhythm. Abdomen is benign. Bilateral femoral pulses are palpable. Staples in place. No induration. No drainage noted. Pedal pulses are non-palpable. Right distal first toe 1cm ulcer improved in appearance.

## 2024-03-26 NOTE — HISTORY OF PRESENT ILLNESS
[FreeTextEntry1] : I have just had the pleasure of seeing Mr. Lex Solorio in consultation from Dr. Jerry for right lower extremity arterial insufficiency.   Mr. Solorio is a 89-year-old gentleman who presents with a three month history of non-healing right first toe wound. He was previously placed on antibiotics. He is followed by Dr. Jerry from podiatry. He has not had any non-invasive testing. He denies any symptoms of lower extremity claudication. He reports rest pain in the distal right foot. He has not had any prior vascular surgical intervention on his lower extremities. He underwent PCI/TAVR (bovine) at [facility] in July 2023. He denies any history of CVA or TIA. He denies any history of CRI or DM although per bloodwork from last week Creatinine is 1.47.  His medical history is otherwise significant for remote CABG x 4 with left GSV harvest, HTN, HLD, hypothyroidism, and bilateral macular degeneration and retinal tears.  Medications: Atenolol, Lipitor, Plavix, Aspirin, Lexapro, Lisinopril, Proscar and Synthroid.  Allergies: NDKA  Social history: Non-smoker  FH: [place]   [de-identified] : 2/12/24: Mr. Solorio presents s/p RLE angiography for PAD with tissue loss. He is accompanied by his wife and two children. Angiography on 2/1/24 showed right common femoral artery stenosis, distal SFA stenosis and tibial disease. No intervention was performed due to the right CFA lesion. He denies any access site complications. He is taking DAPT and statin.   3/4/24: Mr. Solorio is now s/p right femoral endarterectomy, performed on 2/21/24. Angiography showed small vessel disease in the foot. He was discharged home on POD2, but returned with right groin induration consistent with hematoma vs seroma. He was placed on antibiotics and monitored. Hematoma remained stable and he was discharged home on Keflex. He returns today for groin wound check. He reports some serous drainage from the incision.  I removed a staple at the superior aspect of the incision and drained serous fluid. Incision is now much less indurated. Wound was packed with 1/4" iodoform packing. Patient was to return on 3/6/24 for evaluation by ANGELES Greenwood and remaining staple removal. VNS ordered for continued packing at home. Keflex for one additional week ordered. Patient instructed to follow up with podiatry and to continue DAPT, statin.  3/25/24: Patient did not follow up as instructed. He reports that he is going to Dr. Jose Roberto Rincon at [facility]. He reports he is receiving VNS through this provider (due to proximity to home). Per EMR, he was also evaluated by podiatry at [place] and while there evaluated by Dr. Ramirez. He reports that toe was debrided and is healing better. Duplex with vascular showed widely patent FEA repair. He reports decreased drainage from femoral incision.

## 2024-03-26 NOTE — ASSESSMENT
[FreeTextEntry1] : In summary, Mr. Solorio presents s/p right femoral endarterectomy. All staples were removed from the groin. Groin and toe wounds are healing. He should continue DAPT and statin. He will follow up with Vascular surgery closer to his home location.

## 2024-04-15 ENCOUNTER — APPOINTMENT (OUTPATIENT)
Dept: OPHTHALMOLOGY | Facility: CLINIC | Age: 89
End: 2024-04-15

## 2024-05-19 NOTE — H&P PST ADULT - HISTORY OF COVID-19 VACCINATION
patient seen - discussed findings on scan and possible window will not alleviate his symptoms given it is a small amount of fluid.   discussed risks of surgery given his prior cardiac surgery including and not limited to bleeding, injury to heart and/or surrounding structures, need for thoracotomy, infection, scarring, as well as no change in symptoms.   patient expressed understanding and agreeable to proceed
Yes

## 2025-07-10 NOTE — PATIENT PROFILE ADULT - ARRIVAL FROM
Call primary care provider for follow up after discharge/Activities as tolerated/Low salt diet/Monitor weight daily/Report signs and symptoms to primary care provider
Home

## (undated) DEVICE — ELCTR BOVIE PENCIL BLADE 10FT

## (undated) DEVICE — GLV 7.5 PROTEXIS (CREAM) MICRO

## (undated) DEVICE — SUT MONOCRYL 4-0 27" PS-2 UNDYED

## (undated) DEVICE — DRSG PREVENA PEEL & PLACE KIT 20CM

## (undated) DEVICE — Device

## (undated) DEVICE — BAG DECANTER DISP

## (undated) DEVICE — WARMING BLANKET FULL UNDERBODY

## (undated) DEVICE — SUT PROLENE 6-0 30" C-1

## (undated) DEVICE — SUCTION YANKAUER NO CONTROL VENT

## (undated) DEVICE — DRAPE TOWEL BLUE 17" X 24"

## (undated) DEVICE — SUT SILK 4-0 17-18"

## (undated) DEVICE — VISITEC 4X4

## (undated) DEVICE — VESSEL LOOP MAXI-YELLOW  0.120" X 16"

## (undated) DEVICE — SUT PROLENE 7-0 24" BV-1

## (undated) DEVICE — GEL AQUSNC PACKET 20GR

## (undated) DEVICE — POSITIONER STRAP ARMBOARD VELCRO TS-30

## (undated) DEVICE — TOURNIQUET SET SURE-SNARE 22FR (2 TUBES, 2 UMBILICAL TAPES, 2 PLASTIC SNARES) 5"

## (undated) DEVICE — SUT SILK 3-0 18" TIES

## (undated) DEVICE — PACK PERIPHERAL VASC

## (undated) DEVICE — DRAPE ISOLATION BAG 20X20"

## (undated) DEVICE — DRAPE LAPAROTOMY W VELCRO CORD TABS

## (undated) DEVICE — VENODYNE/SCD SLEEVE CALF MEDIUM

## (undated) DEVICE — SOL IRR POUR NS 0.9% 500ML

## (undated) DEVICE — SUT SILK 2-0 18" TIES

## (undated) DEVICE — TORQUE DEVICE FOR GUIDEWIRE 0.0100.038"

## (undated) DEVICE — SUT VICRYL 2-0 27" CT-1 UNDYED

## (undated) DEVICE — DRAPE 3/4 SHEET 52X76"

## (undated) DEVICE — SUT VICRYL 3-0 27" SH UNDYED

## (undated) DEVICE — DRSG TAPE UMBILICAL COTTON 2" X 30 X 1/8"

## (undated) DEVICE — STAPLER SKIN MULTI DIRECTION W35